# Patient Record
Sex: MALE | Race: WHITE | Employment: OTHER | URBAN - METROPOLITAN AREA
[De-identification: names, ages, dates, MRNs, and addresses within clinical notes are randomized per-mention and may not be internally consistent; named-entity substitution may affect disease eponyms.]

---

## 2021-06-09 ENCOUNTER — ANESTHESIA EVENT (OUTPATIENT)
Dept: OPERATING ROOM | Age: 81
End: 2021-06-09
Payer: MEDICARE

## 2021-06-09 RX ORDER — LANOLIN ALCOHOL/MO/W.PET/CERES
1000 CREAM (GRAM) TOPICAL
COMMUNITY

## 2021-06-09 RX ORDER — CHLORAL HYDRATE 500 MG
1000 CAPSULE ORAL DAILY
COMMUNITY

## 2021-06-09 RX ORDER — M-VIT,TX,IRON,MINS/CALC/FOLIC 27MG-0.4MG
1 TABLET ORAL DAILY
COMMUNITY

## 2021-06-10 ENCOUNTER — HOSPITAL ENCOUNTER (OUTPATIENT)
Age: 81
Discharge: HOME | End: 2021-06-10
Payer: MEDICARE

## 2021-06-10 PROCEDURE — U0003 INFECTIOUS AGENT DETECTION BY NUCLEIC ACID (DNA OR RNA); SEVERE ACUTE RESPIRATORY SYNDROME CORONAVIRUS 2 (SARS-COV-2) (CORONAVIRUS DISEASE [COVID-19]), AMPLIFIED PROBE TECHNIQUE, MAKING USE OF HIGH THROUGHPUT TECHNOLOGIES AS DESCRIBED BY CMS-2020-01-R: HCPCS

## 2021-06-10 PROCEDURE — U0005 INFEC AGEN DETEC AMPLI PROBE: HCPCS

## 2021-06-10 NOTE — PROGRESS NOTES
PRE OP INSTRUCTION SHEET   1. Do not eat or drink anything after 12 midnight  prior to surgery. This includes no water, chewing gum or mints. 2. Take the following pills will a small sip of water (see MAR)                                        3. Aspirin, Ibuprofen, Advil, Naproxen, Vitamin E, fish oil and other Anti-inflammatory products should be stopped for 5 days before surgery or as directed by your physician. 4. Check with your Doctor regarding stopping Plavix, Coumadin, Lovenox, Fragmin or other blood thinners   5. Do not smoke, and do not drink any alcoholic beverages 24 hours prior to surgery. This includes NA Beer. 6. You may brush your teeth and gargle the morning of surgery. DO NOT SWALLOW WATER   7. You MUST make arrangements for a responsible adult to take you home after your surgery. You will not be allowed to leave alone or drive yourself home. It is strongly suggested someone stay with you the first 24 hrs. Your surgery will be cancelled if you do not have a ride home. 8. A parent/legal guardian must accompany a child scheduled for surgery and plan to stay at the hospital until the child is discharged. Please do not bring other children with you. 9. Please wear simple, loose fitting clothing to the hospital.  Brian Pineda not bring valuables (money, credit cards, checkbooks, etc.) Do not wear any makeup (including no eye makeup) or nail polish on your fingers or toes. 10. DO NOT wear any jewelry or piercings on day of surgery. All body piercing jewelry must be removed. 11. If you have dentures,glasses, or contacts they will be removed before going to the OR; we will provide you a container. 12. Please see your family doctor/and cardiologist for a history & physical and/or concerning medications. Bring any test results/reports from your physician's office. Have history and labs faxed to [phone].  Remember to bring Blood Bank bracelet on the day of surgery. 14. If you have a Living Will and Durable Power of  for Healthcare, please bring in a copy. 13. Notify your Surgeon if you develop any illness between now and surgery  time, cough, cold, fever, sore throat, nausea, vomiting, etc.  Please notify your surgeon if you experience dizziness, shortness of breath or blurred vision between now & the time of your surgery   16. DO NOT shave your operative site 96 hours prior to surgery. For face & neck surgery, men may use an electric razor 48 hours prior to surgery. 17. Shower with _x__Antibacterial soap (x_chlorhexidine for total joint  Pt's) shower two times before surgery.(the morning of and the night before. 18. To provide excellent care visitors will be limited to one in the room at any given time.   Please call pre admission testing if you any further questions [phone] or [phone]

## 2021-06-11 LAB — SARS-COV-2: NOT DETECTED

## 2021-06-16 ENCOUNTER — ANESTHESIA (OUTPATIENT)
Dept: OPERATING ROOM | Age: 81
End: 2021-06-16
Payer: MEDICARE

## 2021-06-16 ENCOUNTER — HOSPITAL ENCOUNTER (OUTPATIENT)
Age: 81
Setting detail: OUTPATIENT SURGERY
Discharge: HOME | End: 2021-06-16
Attending: UROLOGY | Admitting: UROLOGY
Payer: MEDICARE

## 2021-06-16 VITALS
BODY MASS INDEX: 18.05 KG/M2 | WEIGHT: 115 LBS | HEART RATE: 55 BPM | OXYGEN SATURATION: 98 % | HEIGHT: 67 IN | RESPIRATION RATE: 16 BRPM | DIASTOLIC BLOOD PRESSURE: 62 MMHG | SYSTOLIC BLOOD PRESSURE: 165 MMHG | TEMPERATURE: 97.8 F

## 2021-06-16 VITALS
OXYGEN SATURATION: 100 % | SYSTOLIC BLOOD PRESSURE: 142 MMHG | DIASTOLIC BLOOD PRESSURE: 66 MMHG | RESPIRATION RATE: 14 BRPM

## 2021-06-16 LAB
ANION GAP SERPL CALCULATED.3IONS-SCNC: 9 MMOL/L (ref 3–16)
BUN BLDV-MCNC: 9 MG/DL (ref 7–20)
CALCIUM SERPL-MCNC: 9.1 MG/DL (ref 8.3–10.6)
CHLORIDE BLD-SCNC: 101 MMOL/L (ref 99–110)
CO2: 27 MMOL/L (ref 21–32)
CREAT SERPL-MCNC: 0.6 MG/DL (ref 0.8–1.3)
GFR AFRICAN AMERICAN: >60
GFR NON-AFRICAN AMERICAN: >60
GLUCOSE BLD-MCNC: 85 MG/DL (ref 70–99)
HCT VFR BLD CALC: 42.9 % (ref 40.5–52.5)
HEMOGLOBIN: 14.6 G/DL (ref 13.5–17.5)
MCH RBC QN AUTO: 32.1 PG (ref 26–34)
MCHC RBC AUTO-ENTMCNC: 34 G/DL (ref 31–36)
MCV RBC AUTO: 94.4 FL (ref 80–100)
PDW BLD-RTO: 12.9 % (ref 12.4–15.4)
PLATELET # BLD: 163 K/UL (ref 135–450)
PMV BLD AUTO: 9.2 FL (ref 5–10.5)
POTASSIUM REFLEX MAGNESIUM: 4.1 MMOL/L (ref 3.5–5.1)
RBC # BLD: 4.54 M/UL (ref 4.2–5.9)
SODIUM BLD-SCNC: 137 MMOL/L (ref 136–145)
WBC # BLD: 4.8 K/UL (ref 4–11)

## 2021-06-16 PROCEDURE — 2500000003 HC RX 250 WO HCPCS: Performed by: ANESTHESIOLOGY

## 2021-06-16 PROCEDURE — 2709999900 HC NON-CHARGEABLE SUPPLY: Performed by: UROLOGY

## 2021-06-16 PROCEDURE — 2500000003 HC RX 250 WO HCPCS: Performed by: NURSE ANESTHETIST, CERTIFIED REGISTERED

## 2021-06-16 PROCEDURE — 7100000010 HC PHASE II RECOVERY - FIRST 15 MIN: Performed by: UROLOGY

## 2021-06-16 PROCEDURE — 7100000011 HC PHASE II RECOVERY - ADDTL 15 MIN: Performed by: UROLOGY

## 2021-06-16 PROCEDURE — 80048 BASIC METABOLIC PNL TOTAL CA: CPT

## 2021-06-16 PROCEDURE — 3600000004 HC SURGERY LEVEL 4 BASE: Performed by: UROLOGY

## 2021-06-16 PROCEDURE — 3700000000 HC ANESTHESIA ATTENDED CARE: Performed by: UROLOGY

## 2021-06-16 PROCEDURE — 85027 COMPLETE CBC AUTOMATED: CPT

## 2021-06-16 PROCEDURE — 3600000014 HC SURGERY LEVEL 4 ADDTL 15MIN: Performed by: UROLOGY

## 2021-06-16 PROCEDURE — 36415 COLL VENOUS BLD VENIPUNCTURE: CPT

## 2021-06-16 PROCEDURE — 6360000002 HC RX W HCPCS: Performed by: UROLOGY

## 2021-06-16 PROCEDURE — 2580000003 HC RX 258: Performed by: ANESTHESIOLOGY

## 2021-06-16 PROCEDURE — 6370000000 HC RX 637 (ALT 250 FOR IP): Performed by: UROLOGY

## 2021-06-16 PROCEDURE — 2580000003 HC RX 258: Performed by: NURSE ANESTHETIST, CERTIFIED REGISTERED

## 2021-06-16 PROCEDURE — 6360000002 HC RX W HCPCS: Performed by: NURSE ANESTHETIST, CERTIFIED REGISTERED

## 2021-06-16 PROCEDURE — 3700000001 HC ADD 15 MINUTES (ANESTHESIA): Performed by: UROLOGY

## 2021-06-16 PROCEDURE — 2580000003 HC RX 258: Performed by: UROLOGY

## 2021-06-16 RX ORDER — MAGNESIUM HYDROXIDE 1200 MG/15ML
LIQUID ORAL PRN
Status: DISCONTINUED | OUTPATIENT
Start: 2021-06-16 | End: 2021-06-16 | Stop reason: ALTCHOICE

## 2021-06-16 RX ORDER — LIDOCAINE HYDROCHLORIDE 20 MG/ML
INJECTION, SOLUTION INFILTRATION; PERINEURAL PRN
Status: DISCONTINUED | OUTPATIENT
Start: 2021-06-16 | End: 2021-06-16 | Stop reason: SDUPTHER

## 2021-06-16 RX ORDER — SODIUM CHLORIDE 9 MG/ML
25 INJECTION, SOLUTION INTRAVENOUS PRN
Status: DISCONTINUED | OUTPATIENT
Start: 2021-06-16 | End: 2021-06-16 | Stop reason: HOSPADM

## 2021-06-16 RX ORDER — PHENAZOPYRIDINE HYDROCHLORIDE 200 MG/1
200 TABLET, FILM COATED ORAL 3 TIMES DAILY PRN
Qty: 15 TABLET | Refills: 0 | Status: SHIPPED | OUTPATIENT
Start: 2021-06-16 | End: 2021-06-21

## 2021-06-16 RX ORDER — SODIUM CHLORIDE, SODIUM LACTATE, POTASSIUM CHLORIDE, CALCIUM CHLORIDE 600; 310; 30; 20 MG/100ML; MG/100ML; MG/100ML; MG/100ML
INJECTION, SOLUTION INTRAVENOUS CONTINUOUS
Status: DISCONTINUED | OUTPATIENT
Start: 2021-06-16 | End: 2021-06-16 | Stop reason: HOSPADM

## 2021-06-16 RX ORDER — SODIUM CHLORIDE, SODIUM LACTATE, POTASSIUM CHLORIDE, CALCIUM CHLORIDE 600; 310; 30; 20 MG/100ML; MG/100ML; MG/100ML; MG/100ML
INJECTION, SOLUTION INTRAVENOUS CONTINUOUS PRN
Status: DISCONTINUED | OUTPATIENT
Start: 2021-06-16 | End: 2021-06-16 | Stop reason: SDUPTHER

## 2021-06-16 RX ORDER — SULFAMETHOXAZOLE AND TRIMETHOPRIM 400; 80 MG/1; MG/1
1 TABLET ORAL 2 TIMES DAILY
Qty: 8 TABLET | Refills: 0 | Status: SHIPPED | OUTPATIENT
Start: 2021-06-16 | End: 2021-06-20

## 2021-06-16 RX ORDER — MEPERIDINE HYDROCHLORIDE 25 MG/ML
12.5 INJECTION INTRAMUSCULAR; INTRAVENOUS; SUBCUTANEOUS EVERY 5 MIN PRN
Status: DISCONTINUED | OUTPATIENT
Start: 2021-06-16 | End: 2021-06-16 | Stop reason: HOSPADM

## 2021-06-16 RX ORDER — ONDANSETRON 2 MG/ML
4 INJECTION INTRAMUSCULAR; INTRAVENOUS
Status: DISCONTINUED | OUTPATIENT
Start: 2021-06-16 | End: 2021-06-16 | Stop reason: HOSPADM

## 2021-06-16 RX ORDER — PROPOFOL 10 MG/ML
INJECTION, EMULSION INTRAVENOUS PRN
Status: DISCONTINUED | OUTPATIENT
Start: 2021-06-16 | End: 2021-06-16 | Stop reason: SDUPTHER

## 2021-06-16 RX ORDER — MORPHINE SULFATE 10 MG/ML
2 INJECTION, SOLUTION INTRAMUSCULAR; INTRAVENOUS EVERY 5 MIN PRN
Status: DISCONTINUED | OUTPATIENT
Start: 2021-06-16 | End: 2021-06-16 | Stop reason: HOSPADM

## 2021-06-16 RX ORDER — SODIUM CHLORIDE 0.9 % (FLUSH) 0.9 %
10 SYRINGE (ML) INJECTION PRN
Status: DISCONTINUED | OUTPATIENT
Start: 2021-06-16 | End: 2021-06-16 | Stop reason: HOSPADM

## 2021-06-16 RX ORDER — OXYCODONE HYDROCHLORIDE AND ACETAMINOPHEN 5; 325 MG/1; MG/1
2 TABLET ORAL PRN
Status: DISCONTINUED | OUTPATIENT
Start: 2021-06-16 | End: 2021-06-16 | Stop reason: HOSPADM

## 2021-06-16 RX ORDER — SODIUM CHLORIDE 0.9 % (FLUSH) 0.9 %
10 SYRINGE (ML) INJECTION EVERY 12 HOURS SCHEDULED
Status: DISCONTINUED | OUTPATIENT
Start: 2021-06-16 | End: 2021-06-16 | Stop reason: HOSPADM

## 2021-06-16 RX ORDER — MORPHINE SULFATE 10 MG/ML
1 INJECTION, SOLUTION INTRAMUSCULAR; INTRAVENOUS EVERY 5 MIN PRN
Status: DISCONTINUED | OUTPATIENT
Start: 2021-06-16 | End: 2021-06-16 | Stop reason: HOSPADM

## 2021-06-16 RX ORDER — OXYCODONE HYDROCHLORIDE AND ACETAMINOPHEN 5; 325 MG/1; MG/1
1 TABLET ORAL PRN
Status: DISCONTINUED | OUTPATIENT
Start: 2021-06-16 | End: 2021-06-16 | Stop reason: HOSPADM

## 2021-06-16 RX ORDER — LIDOCAINE HYDROCHLORIDE 20 MG/ML
JELLY TOPICAL PRN
Status: DISCONTINUED | OUTPATIENT
Start: 2021-06-16 | End: 2021-06-16 | Stop reason: ALTCHOICE

## 2021-06-16 RX ADMIN — SODIUM CHLORIDE, POTASSIUM CHLORIDE, SODIUM LACTATE AND CALCIUM CHLORIDE: 600; 310; 30; 20 INJECTION, SOLUTION INTRAVENOUS at 13:58

## 2021-06-16 RX ADMIN — PROPOFOL 120 MG: 10 INJECTION, EMULSION INTRAVENOUS at 14:03

## 2021-06-16 RX ADMIN — FAMOTIDINE 20 MG: 10 INJECTION, SOLUTION INTRAVENOUS at 13:41

## 2021-06-16 RX ADMIN — LIDOCAINE HYDROCHLORIDE 50 MG: 20 INJECTION, SOLUTION INFILTRATION; PERINEURAL at 14:03

## 2021-06-16 RX ADMIN — SODIUM CHLORIDE, POTASSIUM CHLORIDE, SODIUM LACTATE AND CALCIUM CHLORIDE: 600; 310; 30; 20 INJECTION, SOLUTION INTRAVENOUS at 13:41

## 2021-06-16 RX ADMIN — Medication 2000 MG: at 13:56

## 2021-06-16 ASSESSMENT — ENCOUNTER SYMPTOMS: SHORTNESS OF BREATH: 0

## 2021-06-16 ASSESSMENT — LIFESTYLE VARIABLES: SMOKING_STATUS: 0

## 2021-06-16 ASSESSMENT — PULMONARY FUNCTION TESTS: PIF_VALUE: 0

## 2021-06-16 ASSESSMENT — PAIN - FUNCTIONAL ASSESSMENT: PAIN_FUNCTIONAL_ASSESSMENT: 0-10

## 2021-06-16 NOTE — ANESTHESIA POSTPROCEDURE EVALUATION
Department of Anesthesiology  Postprocedure Note    Patient: Rey Reilly  MRN: [id number]  YOB: 1940  Date of evaluation: 6/16/2021  Time:  2:26 PM     Procedure Summary     Date: 06/16/21 Room / Location: [place] [address] / [facility]    Anesthesia Start: 1518 Anesthesia Stop: 6492    Procedure: CYSTOSCOPY, URETHRAL DILATATION (N/A Bladder) Diagnosis: (BENIGN PROSTATIC HYPERTROPHY)    Surgeons: Cedric Marr MD Responsible Provider: Jonnathan Dickerson MD    Anesthesia Type: general, TIVA ASA Status: 2          Anesthesia Type: general, TIVA    Carmelo Phase I: Carmelo Score: 10    Carmelo Phase II:      Last vitals: Reviewed and per EMR flowsheets.      Vitals:    06/10/21 0951 06/16/21 1329   BP:  (!) 180/70   Pulse:  63   Resp:  18   Temp:  97.8 °F (36.6 °C)   TempSrc:  Temporal   SpO2:  99%   Weight: 120 lb (54.4 kg) 115 lb (52.2 kg)   Height: 5' 7\" (1.702 m) 5' 7\" (1.702 m)     Anesthesia Post Evaluation    Patient location during evaluation: bedside  Patient participation: complete - patient participated  Level of consciousness: awake and alert  Airway patency: patent  Nausea & Vomiting: no nausea  Complications: no  Cardiovascular status: hemodynamically stable  Respiratory status: acceptable  Hydration status: euvolemic

## 2021-06-16 NOTE — ANESTHESIA PRE PROCEDURE
Department of Anesthesiology  Preprocedure Note       Name:  Carol Jimenez   Age:  80 y.o.  :  1940                                          MRN:  [id number]         Date:  2021      Surgeon: Юлия Nicolas):  Skeet Najjar, MD    Procedure: Procedure(s):  CYSTOSCOPY, POSSIBLE URETHRAL DILATATION, POSSIBLE BLADDER BIOPSY    Medications prior to admission:   Prior to Admission medications    Medication Sig Start Date End Date Taking? Authorizing Provider   Multiple Vitamins-Minerals (THERAPEUTIC MULTIVITAMIN-MINERALS) tablet Take 1 tablet by mouth daily   Yes Historical Provider, MD   Omega-3 Fatty Acids (FISH OIL) 1000 MG CAPS Take 1,000 mg by mouth daily   Yes Historical Provider, MD   vitamin B-12 (CYANOCOBALAMIN) 1000 MCG tablet Take 1,000 mcg by mouth Twice a Week   Yes Historical Provider, MD       Current medications:    Current Facility-Administered Medications   Medication Dose Route Frequency Provider Last Rate Last Admin    lactated ringers infusion   Intravenous Continuous Terrence Balbuena  mL/hr at 21 1341 New Bag at 21 1341    sodium chloride flush 0.9 % injection 10 mL  10 mL Intravenous 2 times per day Terrence Balbuena MD        sodium chloride flush 0.9 % injection 10 mL  10 mL Intravenous PRN Terrence Balbuena MD        0.9 % sodium chloride infusion  25 mL Intravenous PRN Terrence Balbuena MD        ceFAZolin (ANCEF) 2000 mg in sterile water 20 mL IV syringe  2,000 mg Intravenous Once Skeet Najjar, MD           Allergies:  No Known Allergies    Problem List:  There is no problem list on file for this patient.       Past Medical History:        Diagnosis Date    Fatty liver        Past Surgical History:        Procedure Laterality Date    COLONOSCOPY  2002    COLONOSCOPY  2011       Social History:    Social History     Tobacco Use    Smoking status: Former Smoker     Quit date: 1982     Years since quittin.5    Smokeless tobacco: Never Used   Substance Use Topics    Alcohol use: Yes     Comment: quit 6 months ago                                Counseling given: Not Answered      Vital Signs (Current):   Vitals:    06/10/21 0951 21 1329   BP:  (!) 180/70   Pulse:  63   Resp:  18   Temp:  97.8 °F (36.6 °C)   TempSrc:  Temporal   SpO2:  99%   Weight: 120 lb (54.4 kg) 115 lb (52.2 kg)   Height: 5' 7\" (1.702 m) 5' 7\" (1.702 m)                                              BP Readings from Last 3 Encounters:   21 (!) 180/70   11 144/84       NPO Status: Time of last liquid consumption:                         Time of last solid consumption:                         Date of last liquid consumption: 06/15/21                        Date of last solid food consumption: 06/15/21    BMI:   Wt Readings from Last 3 Encounters:   21 115 lb (52.2 kg)   11 108 lb (49 kg)     Body mass index is 18.01 kg/m². CBC:   Lab Results   Component Value Date    WBC 4.8 2021    RBC 4.54 2021    HGB 14.6 2021    HCT 42.9 2021    MCV 94.4 2021    RDW 12.9 2021     2021       CMP: No results found for: NA, K, CL, CO2, BUN, CREATININE, GFRAA, AGRATIO, LABGLOM, GLUCOSE, PROT, CALCIUM, BILITOT, ALKPHOS, AST, ALT    POC Tests: No results for input(s): POCGLU, POCNA, POCK, POCCL, POCBUN, POCHEMO, POCHCT in the last 72 hours.     Coags: No results found for: PROTIME, INR, APTT    HCG (If Applicable): No results found for: PREGTESTUR, PREGSERUM, HCG, HCGQUANT     ABGs: No results found for: PHART, PO2ART, ZOY0EOX, BRR6SYU, BEART, H1VNSRJH     Type & Screen (If Applicable):  No results found for: LABABO, LABRH    Drug/Infectious Status (If Applicable):  No results found for: HIV, HEPCAB    COVID-19 Screening (If Applicable):   Lab Results   Component Value Date    COVID19 Not Detected 06/10/2021           Anesthesia Evaluation  Patient summary reviewed no history of anesthetic complications:   Airway: Mallampati: II  TM distance: >3 FB   Neck ROM: full  Mouth opening: > = 3 FB Dental:          Pulmonary: breath sounds clear to auscultation  (+) COPD (no o2 req., no inhalers or nebs, no physical limiys):      (-) asthma, shortness of breath, recent URI, sleep apnea and not a current smoker          Patient did not smoke on day of surgery. Cardiovascular:Negative CV ROS        (-) pacemaker, hypertension, past MI, CAD, CABG/stent, dysrhythmias,  angina and  CHF    ECG reviewed  Rhythm: regular  Rate: normal           Beta Blocker:  Not on Beta Blocker         Neuro/Psych:   Negative Neuro/Psych ROS     (-) seizures, TIA, CVA and psychiatric history           GI/Hepatic/Renal:   (+) liver disease (fatty):,      (-) GERD, no renal disease, bowel prep and no morbid obesity       Endo/Other:    (+) : arthritis:., no malignancy/cancer. (-) diabetes mellitus, hypothyroidism, hyperthyroidism, blood dyscrasia, no malignancy/cancer               Abdominal:           Vascular: negative vascular ROS. Anesthesia Plan      general and TIVA     ASA 2       Induction: intravenous. MIPS: Postoperative opioids intended and Prophylactic antiemetics administered. Anesthetic plan and risks discussed with patient. Plan discussed with CRNA. This pre-anesthesia assessment may be used as a history and physical.    DOS STAFF ADDENDUM:    Pt seen and examined, chart reviewed (including anesthesia, drug and allergy history). No interval changes to history and physical examination. Anesthetic plan, risks, benefits, alternatives, and personnel involved discussed with patient. Patient verbalized an understanding and agrees to proceed.       Mily Nicole MD  June 16, 2021  1:57 PM      Mily Nicole MD   6/16/2021

## 2021-06-18 NOTE — OP NOTE
[address]                 [address] [facility], [place] [address]                                OPERATIVE REPORT    PATIENT NAME: Kristan Comer                       :        1940  MED REC NO:   [id number]                          ROOM:  ACCOUNT NO:   [de-identified]                           ADMIT DATE: 2021  PROVIDER:     Shandra Hawkins MD    DATE OF PROCEDURE:  2021    PREOPERATIVE DIAGNOSIS:  1.  Voiding difficulty. 2.  BPH. 3.  Incomplete bladder emptying. POSTOPERATIVE DIAGNOSES:  1.  Voiding difficulty. 2.  BPH. 3.  Incomplete bladder emptying. OPERATION PERFORMED:  Cystoscopy with urethral dilation. PRIMARY SURGEON:  Shandra Hawkins MD    ANESTHESIA:  General.    Fauzia Leaks BLOOD LOSS:  5 mL. OPERATIVE FINDINGS:  1.  Bilobar hyperplasia along with a large median lobe extending into  the patient's bladder. 2.  No evidence for bladder cancer, tumors, or stones. 3.  Diffuse trabeculation of the bladder consistent with chronic outlet  obstruction. HISTORY AND INDICATIONS:  A 81-year-old white male with a history of  worsening obstructive and irritative voiding symptoms. To rule out  bladder pathology and assess for outlet obstruction, he was scheduled  for cystourethroscopy. Risks, benefits, and expected outcomes of the  procedure have been discussed. PROCEDURE IN DETAIL:  After obtaining informed consent, the patient was  taken to the operative suite. He was given a general anesthetic and  placed on table in modified dorsal lithotomy position. Prepping and  draping was done in sterile fashion. Cystourethroscopy was then  performed with both 30- and 70-degree lenses. Trilobar hyperplasia was  noted with large median lobe and bilobar hyperplasia. Bladder itself  was found to be diffusely trabeculated.   There is no evidence for any  bladder cancer, tumors, or stones and both ureteral orifices were found  to be orthotopic with clear efflux. Dilation was then carried out with  Fayetta Carlos Eduardo sounds to 28-[place]. His bladder was drained. Lidocaine  jelly was applied. He was then taken back to postop recovery room in  stable condition. Postoperative consultation done with the patient and his family and  pictures have been discussed showing the obstructing prostate tissue. He will be seen in the office in the next 2-3 weeks and will be  scheduled likely for an outpatient TURP to remove the obstructing  prostatic fossa and to help preserve his remaining bladder function.         Rell Thompson MD    D: 06/17/2021 22:31:10       T: 06/17/2021 22:38:59     [email or web address]  Job#: [id number]     Doc#: [id number]    CC:

## 2022-03-07 ENCOUNTER — APPOINTMENT (OUTPATIENT)
Dept: CT IMAGING | Age: 82
DRG: 178 | End: 2022-03-07
Payer: MEDICARE

## 2022-03-07 ENCOUNTER — HOSPITAL ENCOUNTER (INPATIENT)
Age: 82
LOS: 1 days | Discharge: HOME HEALTH | DRG: 178 | End: 2022-03-08
Attending: EMERGENCY MEDICINE | Admitting: INTERNAL MEDICINE
Payer: MEDICARE

## 2022-03-07 ENCOUNTER — APPOINTMENT (OUTPATIENT)
Dept: GENERAL RADIOLOGY | Age: 82
DRG: 178 | End: 2022-03-07
Payer: MEDICARE

## 2022-03-07 DIAGNOSIS — Z86.16 HISTORY OF COVID-19: ICD-10-CM

## 2022-03-07 DIAGNOSIS — J96.01 ACUTE RESPIRATORY FAILURE WITH HYPOXIA (HCC): ICD-10-CM

## 2022-03-07 DIAGNOSIS — S22.31XA CLOSED FRACTURE OF ONE RIB OF RIGHT SIDE, INITIAL ENCOUNTER: ICD-10-CM
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
## 2022-03-07 DIAGNOSIS — U07.1 COVID-19: ICD-10-CM

## 2022-03-07 LAB
A/G RATIO: 1 (ref 1.1–2.2)
ALBUMIN SERPL-MCNC: 3.5 G/DL (ref 3.4–5)
ALP BLD-CCNC: 70 U/L (ref 40–129)
ALT SERPL-CCNC: 18 U/L (ref 10–40)
ANION GAP SERPL CALCULATED.3IONS-SCNC: 8 MMOL/L (ref 3–16)
AST SERPL-CCNC: 35 U/L (ref 15–37)
BASOPHILS ABSOLUTE: 0 K/UL (ref 0–0.2)
BASOPHILS RELATIVE PERCENT: 0.4 %
BILIRUB SERPL-MCNC: <0.2 MG/DL (ref 0–1)
BILIRUBIN URINE: NEGATIVE
BLOOD, URINE: NEGATIVE
BUN BLDV-MCNC: 14 MG/DL (ref 7–20)
C-REACTIVE PROTEIN: 72.5 MG/L (ref 0–5.1)
CALCIUM SERPL-MCNC: 8.9 MG/DL (ref 8.3–10.6)
CHLORIDE BLD-SCNC: 94 MMOL/L (ref 99–110)
CLARITY: CLEAR
CO2: 30 MMOL/L (ref 21–32)
COLOR: YELLOW
CREAT SERPL-MCNC: 0.8 MG/DL (ref 0.8–1.3)
EKG ATRIAL RATE: 83 BPM
EKG DIAGNOSIS: NORMAL
EKG P AXIS: 79 DEGREES
EKG P-R INTERVAL: 142 MS
EKG Q-T INTERVAL: 364 MS
EKG QRS DURATION: 70 MS
EKG QTC CALCULATION (BAZETT): 427 MS
EKG R AXIS: 15 DEGREES
EKG T AXIS: 77 DEGREES
EKG VENTRICULAR RATE: 83 BPM
EOSINOPHILS ABSOLUTE: 0 K/UL (ref 0–0.6)
EOSINOPHILS RELATIVE PERCENT: 0.2 %
EPITHELIAL CELLS, UA: ABNORMAL /HPF (ref 0–5)
GFR AFRICAN AMERICAN: >60
GFR NON-AFRICAN AMERICAN: >60
GLUCOSE BLD-MCNC: 92 MG/DL (ref 70–99)
GLUCOSE URINE: NEGATIVE MG/DL
HCT VFR BLD CALC: 38.3 % (ref 40.5–52.5)
HEMOGLOBIN: 13.3 G/DL (ref 13.5–17.5)
INFLUENZA A: NOT DETECTED
INFLUENZA B: NOT DETECTED
KETONES, URINE: NEGATIVE MG/DL
LACTATE DEHYDROGENASE: 338 U/L (ref 100–190)
LACTIC ACID, SEPSIS: 1.2 MMOL/L (ref 0.4–1.9)
LEUKOCYTE ESTERASE, URINE: NEGATIVE
LYMPHOCYTES ABSOLUTE: 0.3 K/UL (ref 1–5.1)
LYMPHOCYTES RELATIVE PERCENT: 8.8 %
MCH RBC QN AUTO: 31 PG (ref 26–34)
MCHC RBC AUTO-ENTMCNC: 34.6 G/DL (ref 31–36)
MCV RBC AUTO: 89.7 FL (ref 80–100)
MICROSCOPIC EXAMINATION: YES
MONOCYTES ABSOLUTE: 0.5 K/UL (ref 0–1.3)
MONOCYTES RELATIVE PERCENT: 13.5 %
MUCUS: ABNORMAL /LPF
NEUTROPHILS ABSOLUTE: 3 K/UL (ref 1.7–7.7)
NEUTROPHILS RELATIVE PERCENT: 77.1 %
NITRITE, URINE: NEGATIVE
PDW BLD-RTO: 12.7 % (ref 12.4–15.4)
PH UA: 7 (ref 5–8)
PLATELET # BLD: 151 K/UL (ref 135–450)
PMV BLD AUTO: 8.4 FL (ref 5–10.5)
POTASSIUM REFLEX MAGNESIUM: 4 MMOL/L (ref 3.5–5.1)
PRO-BNP: 171 PG/ML (ref 0–449)
PROCALCITONIN: 0.1 NG/ML (ref 0–0.15)
PROTEIN UA: ABNORMAL MG/DL
RBC # BLD: 4.27 M/UL (ref 4.2–5.9)
RBC UA: ABNORMAL /HPF (ref 0–4)
SARS-COV-2 RNA, RT PCR: DETECTED
SODIUM BLD-SCNC: 132 MMOL/L (ref 136–145)
SPECIFIC GRAVITY UA: 1.01 (ref 1–1.03)
TOTAL PROTEIN: 6.9 G/DL (ref 6.4–8.2)
TROPONIN: <0.01 NG/ML
URINE REFLEX TO CULTURE: ABNORMAL
URINE TYPE: ABNORMAL
UROBILINOGEN, URINE: 0.2 E.U./DL
WBC # BLD: 3.8 K/UL (ref 4–11)
WBC UA: ABNORMAL /HPF (ref 0–5)

## 2022-03-07 PROCEDURE — 6360000004 HC RX CONTRAST MEDICATION: Performed by: NURSE PRACTITIONER

## 2022-03-07 PROCEDURE — 96374 THER/PROPH/DIAG INJ IV PUSH: CPT

## 2022-03-07 PROCEDURE — 97535 SELF CARE MNGMENT TRAINING: CPT

## 2022-03-07 PROCEDURE — 82728 ASSAY OF FERRITIN: CPT

## 2022-03-07 PROCEDURE — 97161 PT EVAL LOW COMPLEX 20 MIN: CPT

## 2022-03-07 PROCEDURE — 99285 EMERGENCY DEPT VISIT HI MDM: CPT

## 2022-03-07 PROCEDURE — 6370000000 HC RX 637 (ALT 250 FOR IP): Performed by: NURSE PRACTITIONER

## 2022-03-07 PROCEDURE — 87636 SARSCOV2 & INF A&B AMP PRB: CPT

## 2022-03-07 PROCEDURE — 97166 OT EVAL MOD COMPLEX 45 MIN: CPT

## 2022-03-07 PROCEDURE — 6360000002 HC RX W HCPCS: Performed by: NURSE PRACTITIONER

## 2022-03-07 PROCEDURE — 71260 CT THORAX DX C+: CPT

## 2022-03-07 PROCEDURE — 84484 ASSAY OF TROPONIN QUANT: CPT

## 2022-03-07 PROCEDURE — 84145 PROCALCITONIN (PCT): CPT

## 2022-03-07 PROCEDURE — 93005 ELECTROCARDIOGRAM TRACING: CPT | Performed by: NURSE PRACTITIONER

## 2022-03-07 PROCEDURE — 81001 URINALYSIS AUTO W/SCOPE: CPT

## 2022-03-07 PROCEDURE — 2580000003 HC RX 258: Performed by: HOSPITALIST

## 2022-03-07 PROCEDURE — 80053 COMPREHEN METABOLIC PANEL: CPT

## 2022-03-07 PROCEDURE — 85025 COMPLETE CBC W/AUTO DIFF WBC: CPT

## 2022-03-07 PROCEDURE — 83605 ASSAY OF LACTIC ACID: CPT

## 2022-03-07 PROCEDURE — 1200000000 HC SEMI PRIVATE

## 2022-03-07 PROCEDURE — 71101 X-RAY EXAM UNILAT RIBS/CHEST: CPT

## 2022-03-07 PROCEDURE — 96375 TX/PRO/DX INJ NEW DRUG ADDON: CPT

## 2022-03-07 PROCEDURE — 6370000000 HC RX 637 (ALT 250 FOR IP): Performed by: HOSPITALIST

## 2022-03-07 PROCEDURE — 93010 ELECTROCARDIOGRAM REPORT: CPT | Performed by: INTERNAL MEDICINE

## 2022-03-07 PROCEDURE — 86140 C-REACTIVE PROTEIN: CPT

## 2022-03-07 PROCEDURE — 83880 ASSAY OF NATRIURETIC PEPTIDE: CPT

## 2022-03-07 PROCEDURE — 83615 LACTATE (LD) (LDH) ENZYME: CPT

## 2022-03-07 PROCEDURE — 87040 BLOOD CULTURE FOR BACTERIA: CPT

## 2022-03-07 PROCEDURE — 97116 GAIT TRAINING THERAPY: CPT

## 2022-03-07 PROCEDURE — 36415 COLL VENOUS BLD VENIPUNCTURE: CPT

## 2022-03-07 RX ORDER — M-VIT,TX,IRON,MINS/CALC/FOLIC 27MG-0.4MG
1 TABLET ORAL DAILY
Status: DISCONTINUED | OUTPATIENT
Start: 2022-03-07 | End: 2022-03-08 | Stop reason: HOSPADM

## 2022-03-07 RX ORDER — ACETAMINOPHEN 325 MG/1
650 TABLET ORAL EVERY 6 HOURS PRN
Status: DISCONTINUED | OUTPATIENT
Start: 2022-03-07 | End: 2022-03-08 | Stop reason: HOSPADM

## 2022-03-07 RX ORDER — MORPHINE SULFATE 2 MG/ML
2 INJECTION, SOLUTION INTRAMUSCULAR; INTRAVENOUS EVERY 4 HOURS PRN
Status: DISCONTINUED | OUTPATIENT
Start: 2022-03-07 | End: 2022-03-08 | Stop reason: HOSPADM

## 2022-03-07 RX ORDER — SODIUM CHLORIDE 0.9 % (FLUSH) 0.9 %
5-40 SYRINGE (ML) INJECTION EVERY 12 HOURS SCHEDULED
Status: DISCONTINUED | OUTPATIENT
Start: 2022-03-07 | End: 2022-03-08 | Stop reason: HOSPADM

## 2022-03-07 RX ORDER — POLYETHYLENE GLYCOL 3350 17 G/17G
17 POWDER, FOR SOLUTION ORAL DAILY PRN
Status: DISCONTINUED | OUTPATIENT
Start: 2022-03-07 | End: 2022-03-08 | Stop reason: HOSPADM

## 2022-03-07 RX ORDER — LIDOCAINE 4 G/G
1 PATCH TOPICAL ONCE
Status: COMPLETED | OUTPATIENT
Start: 2022-03-07 | End: 2022-03-08

## 2022-03-07 RX ORDER — ONDANSETRON 4 MG/1
4 TABLET, ORALLY DISINTEGRATING ORAL EVERY 8 HOURS PRN
Status: DISCONTINUED | OUTPATIENT
Start: 2022-03-07 | End: 2022-03-08 | Stop reason: HOSPADM

## 2022-03-07 RX ORDER — HYDROCODONE BITARTRATE AND ACETAMINOPHEN 7.5; 325 MG/1; MG/1
1 TABLET ORAL EVERY 6 HOURS PRN
Status: DISCONTINUED | OUTPATIENT
Start: 2022-03-07 | End: 2022-03-08 | Stop reason: HOSPADM

## 2022-03-07 RX ORDER — HYDROCODONE BITARTRATE AND ACETAMINOPHEN 5; 325 MG/1; MG/1
1 TABLET ORAL EVERY 8 HOURS PRN
Qty: 12 TABLET | Refills: 0 | Status: SHIPPED | OUTPATIENT
Start: 2022-03-07 | End: 2022-03-10

## 2022-03-07 RX ORDER — DEXAMETHASONE SODIUM PHOSPHATE 10 MG/ML
6 INJECTION, SOLUTION INTRAMUSCULAR; INTRAVENOUS EVERY 24 HOURS
Status: DISCONTINUED | OUTPATIENT
Start: 2022-03-08 | End: 2022-03-08 | Stop reason: HOSPADM

## 2022-03-07 RX ORDER — CHOLECALCIFEROL (VITAMIN D3) 125 MCG
1000 CAPSULE ORAL
Status: DISCONTINUED | OUTPATIENT
Start: 2022-03-07 | End: 2022-03-08 | Stop reason: HOSPADM

## 2022-03-07 RX ORDER — LIDOCAINE 4 G/G
1 PATCH TOPICAL DAILY
Status: DISCONTINUED | OUTPATIENT
Start: 2022-03-08 | End: 2022-03-08 | Stop reason: HOSPADM

## 2022-03-07 RX ORDER — SODIUM CHLORIDE 9 MG/ML
25 INJECTION, SOLUTION INTRAVENOUS PRN
Status: DISCONTINUED | OUTPATIENT
Start: 2022-03-07 | End: 2022-03-08 | Stop reason: HOSPADM

## 2022-03-07 RX ORDER — DEXAMETHASONE SODIUM PHOSPHATE 10 MG/ML
4 INJECTION, SOLUTION INTRAMUSCULAR; INTRAVENOUS EVERY 6 HOURS
Status: DISCONTINUED | OUTPATIENT
Start: 2022-03-07 | End: 2022-03-07

## 2022-03-07 RX ORDER — CHLORAL HYDRATE 500 MG
1000 CAPSULE ORAL DAILY
Status: DISCONTINUED | OUTPATIENT
Start: 2022-03-07 | End: 2022-03-07 | Stop reason: RX

## 2022-03-07 RX ORDER — ACETAMINOPHEN 650 MG/1
650 SUPPOSITORY RECTAL EVERY 6 HOURS PRN
Status: DISCONTINUED | OUTPATIENT
Start: 2022-03-07 | End: 2022-03-08 | Stop reason: HOSPADM

## 2022-03-07 RX ORDER — MORPHINE SULFATE 2 MG/ML
2 INJECTION, SOLUTION INTRAMUSCULAR; INTRAVENOUS ONCE
Status: COMPLETED | OUTPATIENT
Start: 2022-03-07 | End: 2022-03-07

## 2022-03-07 RX ORDER — SODIUM CHLORIDE 0.9 % (FLUSH) 0.9 %
5-40 SYRINGE (ML) INJECTION PRN
Status: DISCONTINUED | OUTPATIENT
Start: 2022-03-07 | End: 2022-03-08 | Stop reason: HOSPADM

## 2022-03-07 RX ORDER — ONDANSETRON 2 MG/ML
4 INJECTION INTRAMUSCULAR; INTRAVENOUS EVERY 6 HOURS PRN
Status: DISCONTINUED | OUTPATIENT
Start: 2022-03-07 | End: 2022-03-08 | Stop reason: HOSPADM

## 2022-03-07 RX ORDER — LIDOCAINE 50 MG/G
1 PATCH TOPICAL DAILY
Qty: 10 PATCH | Refills: 0 | Status: SHIPPED | OUTPATIENT
Start: 2022-03-07 | End: 2022-03-17

## 2022-03-07 RX ORDER — METHOCARBAMOL 500 MG/1
500 TABLET, FILM COATED ORAL 2 TIMES DAILY
Qty: 10 TABLET | Refills: 0 | Status: SHIPPED | OUTPATIENT
Start: 2022-03-07 | End: 2022-03-12

## 2022-03-07 RX ADMIN — ENOXAPARIN SODIUM 30 MG: 100 INJECTION SUBCUTANEOUS at 20:58

## 2022-03-07 RX ADMIN — CYANOCOBALAMIN TAB 500 MCG 1000 MCG: 500 TAB at 23:08

## 2022-03-07 RX ADMIN — IOPAMIDOL 85 ML: 755 INJECTION, SOLUTION INTRAVENOUS at 18:21

## 2022-03-07 RX ADMIN — Medication 10 ML: at 23:07

## 2022-03-07 RX ADMIN — MORPHINE SULFATE 2 MG: 2 INJECTION, SOLUTION INTRAMUSCULAR; INTRAVENOUS at 12:38

## 2022-03-07 RX ADMIN — Medication 1 TABLET: at 20:57

## 2022-03-07 RX ADMIN — DEXAMETHASONE SODIUM PHOSPHATE 4 MG: 10 INJECTION, SOLUTION INTRAMUSCULAR; INTRAVENOUS at 17:01

## 2022-03-07 ASSESSMENT — PAIN DESCRIPTION - ORIENTATION: ORIENTATION: RIGHT

## 2022-03-07 ASSESSMENT — PAIN DESCRIPTION - LOCATION: LOCATION: RIB CAGE

## 2022-03-07 ASSESSMENT — PAIN SCALES - GENERAL
PAINLEVEL_OUTOF10: 5
PAINLEVEL_OUTOF10: 2
PAINLEVEL_OUTOF10: 2
PAINLEVEL_OUTOF10: 0

## 2022-03-07 ASSESSMENT — PAIN - FUNCTIONAL ASSESSMENT: PAIN_FUNCTIONAL_ASSESSMENT: 0-10

## 2022-03-07 NOTE — ED NOTES
Ambulatory pulse ox. ..start at 1424 resting room air SaO2 of 93%, respirations 24, pulse of 98. Patient was ambulated for 6 minutes, with ending SaO2 of 91%, respirations of 30, and heart rate of 104.      Miguel Alexis  03/07/22 1434

## 2022-03-07 NOTE — ED PROVIDER NOTES
Tam[address] 52        Pt Name: Rao Das  MRN: [id number]  [place] 1940  Dateof evaluation: 3/7/2022  Provider: LIZET Rabago - KANA  PCP: Macrina Almanzar MD  ED Attending: No att. providers found    [address] [place] [address]       Chief Complaint   Patient presents with    Rib Injury     Slipped getting out of the bathtub on Friday. Right sided rib pain. Tested positive for Covid on Friday, denies SOB. HISTORY OF PRESENTILLNESS   (Location/Symptom, Timing/Onset, Context/Setting, Quality, Duration, Modifying Factors, Severity)  Note limiting factors. Rao Das is a 80 y.o. male for right-sided rib pain. Onset was 3 days. Context includes patient reports that he slipped on a wet tub landing on his right rib cage. Patient reports that he has had pain for the last 3 days. Patient reports that he recently tested positive for Covid and has been on antibiotics for bacterial pneumonia from his primary care doctor. Patient denies any fevers nausea vomiting or diarrhea. He does report he has had a cough however has had pain with coughing. He denies chest pain or shortness of breath. Patient denies hitting his head with this fall. He states that he slipped on a wet surface. Alleviating factors include nothing. Aggravating factors include nothing. Pain is 10/10. Nothing has been used for pain today. Nursing Notes were all reviewed and agreed with or any disagreements were addressed  in the HPI. REVIEW OF SYSTEMS    (2-9 systems for level 4, 10 or more for level 5)     Review of Systems   Constitutional: Negative for activity change, appetite change and fever. HENT: Negative for congestion, facial swelling, rhinorrhea and sore throat. Eyes: Negative for visual disturbance. Respiratory: Negative for shortness of breath. Right-sided rib   Cardiovascular: Negative for chest pain.    Gastrointestinal: Negative for abdominal pain. Genitourinary: Negative for difficulty urinating. Musculoskeletal: Negative for arthralgias and myalgias. Skin: Negative for color change and rash. Neurological: Negative for dizziness and light-headedness. Psychiatric/Behavioral: Negative for agitation. All other systems reviewed and are negative. Positives and Pertinent negatives as per HPI. Except as noted above in the ROS, all other systems were reviewed and negative.        PAST MEDICAL HISTORY     Past Medical History:   Diagnosis Date    BPH (benign prostatic hyperplasia)     Cataract     COVID 03/04/2022    Fatty liver 2011    Glaucoma     Heart murmur after rheumatic heart disease     Hyperlipidemia     Hypertension     Left rotator cuff tear     Pneumothorax     per pt    Rheumatic fever     when pt was young    Strep throat          SURGICAL HISTORY       Past Surgical History:   Procedure Laterality Date    COLONOSCOPY  03/08/2002    COLONOSCOPY  12/06/2011    CYSTOSCOPY N/A 06/16/2021    URETHRAL DILATATION    CYSTOSCOPY N/A 6/16/2021    CYSTOSCOPY, URETHRAL DILATATION performed by Aguila Sanchez MD at [address] [place] [address]       Discharge Medication List as of 3/8/2022 12:23 PM      CONTINUE these medications which have NOT CHANGED    Details   Multiple Vitamins-Minerals (THERAPEUTIC MULTIVITAMIN-MINERALS) tablet Take 1 tablet by mouth dailyHistorical Med      Omega-3 Fatty Acids (FISH OIL) 1000 MG CAPS Take 1,000 mg by mouth dailyHistorical Med      vitamin B-12 (CYANOCOBALAMIN) 1000 MCG tablet Take 1,000 mcg by mouth Twice a WeekHistorical Med               ALLERGIES     Amoxicillin    FAMILY HISTORY       Family History   Problem Relation Age of Onset    Heart Disease Father           SOCIAL HISTORY       Social History     Socioeconomic History    Marital status:      Spouse name: Lisette Drake Number of children: None    Years of education: None    Highest education level: None   Occupational History    None   Tobacco Use    Smoking status: Former Smoker     Quit date: 1982     Years since quittin.2    Smokeless tobacco: Never Used   Vaping Use    Vaping Use: Never used   Substance and Sexual Activity    Alcohol use: Not Currently     Comment: quit     Drug use: Never    Sexual activity: Not Currently     Partners: Female   Other Topics Concern    None   Social History Narrative    None     Social Determinants of Health     Financial Resource Strain:     Difficulty of Paying Living Expenses: Not on file   Food Insecurity:     Worried About Running Out of Food in the Last Year: Not on file    Rochelle of Food in the Last Year: Not on file   Transportation Needs:     Lack of Transportation (Medical): Not on file    Lack of Transportation (Non-Medical):  Not on file   Physical Activity:     Days of Exercise per Week: Not on file    Minutes of Exercise per Session: Not on file   Stress:     Feeling of Stress : Not on file   Social Connections:     Frequency of Communication with Friends and Family: Not on file    Frequency of Social Gatherings with Friends and Family: Not on file    Attends Episcopal Services: Not on file    Active Member of [address] [place] [address] [employer] or Organizations: Not on file    Attends Club or Organization Meetings: Not on file    Marital Status: Not on file   Intimate Partner Violence:     Fear of Current or Ex-Partner: Not on file    Emotionally Abused: Not on file    Physically Abused: Not on file    Sexually Abused: Not on file   Housing Stability:     Unable to Pay for Housing in the Last Year: Not on file    Number of [place] in the Last Year: Not on file    Unstable Housing in the Last Year: Not on file       SCREENINGS    Justo Coma Scale  Eye Opening: Spontaneous  Best Verbal Response: Oriented  Best Motor Response: Obeys commands  Fraser Coma Scale Score: 15        PHYSICAL EXAM  (up to 7 for level 4, 8 or more for level 5)     ED Triage Vitals [03/07/22 1116]   BP Temp Temp Source Pulse Resp SpO2 Height Weight   127/61 98.8 °F (37.1 °C) Oral 105 18 96 % 5' 7\" (1.702 m) 114 lb (51.7 kg)       Physical Exam  Constitutional:       Appearance: He is well-developed. HENT:      Head: Normocephalic and atraumatic. Cardiovascular:      Rate and Rhythm: Tachycardia present. Pulmonary:      Effort: Pulmonary effort is normal. Tachypnea present. No respiratory distress. Breath sounds: Examination of the right-lower field reveals decreased breath sounds. Examination of the left-lower field reveals decreased breath sounds. Decreased breath sounds present. Chest:      Chest wall: Tenderness present. Abdominal:      General: There is no distension. Palpations: Abdomen is soft. Tenderness: There is no abdominal tenderness. Musculoskeletal:         General: Normal range of motion. Cervical back: Normal range of motion. Skin:     General: Skin is warm and dry. Neurological:      Mental Status: He is alert and oriented to person, place, and time.          DIAGNOSTIC RESULTS   LABS:    Labs Reviewed   COVID-19 & INFLUENZA COMBO - Abnormal; Notable for the following components:       Result Value    SARS-CoV-2 RNA, RT PCR DETECTED (*)     All other components within normal limits   CBC WITH AUTO DIFFERENTIAL - Abnormal; Notable for the following components:    WBC 3.8 (*)     Hemoglobin 13.3 (*)     Hematocrit 38.3 (*)     Lymphocytes Absolute 0.3 (*)     All other components within normal limits   COMPREHENSIVE METABOLIC PANEL W/ REFLEX TO MG FOR LOW K - Abnormal; Notable for the following components:    Sodium 132 (*)     Chloride 94 (*)     Albumin/Globulin Ratio 1.0 (*)     All other components within normal limits   URINALYSIS WITH REFLEX TO CULTURE - Abnormal; Notable for the following components:    Protein, UA TRACE (*)     All other components within normal limits   MICROSCOPIC URINALYSIS - Abnormal; Notable for the following components:    Mucus, UA 1+ (*)     All other components within normal limits   C-REACTIVE PROTEIN - Abnormal; Notable for the following components:    CRP 72.5 (*)     All other components within normal limits   FERRITIN - Abnormal; Notable for the following components:    Ferritin 636.5 (*)     All other components within normal limits   LACTATE DEHYDROGENASE - Abnormal; Notable for the following components:     (*)     All other components within normal limits   CBC WITH AUTO DIFFERENTIAL - Abnormal; Notable for the following components:    WBC 2.4 (*)     RBC 4.18 (*)     Hemoglobin 12.8 (*)     Hematocrit 37.4 (*)     Lymphocytes Absolute 0.3 (*)     All other components within normal limits   COMPREHENSIVE METABOLIC PANEL W/ REFLEX TO MG FOR LOW K - Abnormal; Notable for the following components:    Sodium 133 (*)     Chloride 95 (*)     Glucose 137 (*)     CREATININE 0.7 (*)     Albumin 3.3 (*)     Albumin/Globulin Ratio 0.9 (*)     All other components within normal limits   CULTURE, BLOOD 2    Narrative:     ORDER#: [id number]                          ORDERED BY: WENDY MALONE  SOURCE: Blood lfa                          COLLECTED:  03/07/22 12:37  ANTIBIOTICS AT CASSY.:                      RECEIVED :  03/07/22 18:02  If child <=2 yrs old please draw pediatric bottle. ~Blood Culture #2   CULTURE, BLOOD 1    Narrative:     ORDER#: [id number]                          ORDERED BY: Tamy Robin  SOURCE: Blood RAC                          COLLECTED:  03/07/22 11:57  ANTIBIOTICS AT CASSY.:                      RECEIVED :  03/07/22 18:02  If child <=2 yrs old please draw pediatric bottle. ~Blood Culture 1   PROCALCITONIN   LACTATE, SEPSIS   BRAIN NATRIURETIC PEPTIDE   TROPONIN       All other labs werewithin normal range or not returned as of this dictation. EKG:  All EKG's are interpreted by the Emergency Department Physician who either signs or Co-signs this chart in the absence of a cardiologist.  Please see their note for interpretation of EKG. RADIOLOGY:   Right rib x-ray including chest interpreted by radiologist for  Impression:    Nondisplaced fracture right 10th rib     Right parahilar linear opacities could represent scarring or infection               Interpretation per the Radiologist below, if available at the time of this note:    CT CHEST PULMONARY EMBOLISM W CONTRAST   Final Result   No evidence of a pulmonary embolus. Mildly dilated and moderately atherosclerotic thoracic aorta with no aneurysm   or dissection. Chronic obstructive lung changes with mild bibasilar atelectasis and/or   infiltrates vs parenchymal fibrosis and scarring extending into the   superiorly and into the right upper lobe      1.4 cm pleural-based rounded opacity along the right lower lobe posteriorly   which could represent an infiltrate vs mass or lung contusion. Recommend   short-term follow-up. Nondisplaced fracture of the 10th rib on the right with no effusion or   pneumothorax. Bullous emphysematous changes of the lungs with mild biapical pleural   thickening and scarring. Mild compression deformities along the upper thoracic spine which are   probably chronic. Recommend clinical follow-up. XR RIBS RIGHT INCLUDE CHEST (MIN 3 VIEWS)   Final Result   Nondisplaced fracture right 10th rib      Right parahilar linear opacities could represent scarring or infection         CT CHEST WO CONTRAST    (Results Pending)     No results found.       PROCEDURES   Unless otherwise noted below, none     Procedures     CRITICAL CARE TIME   N/A    CONSULTS:  IP CONSULT TO CASE MANAGEMENT  IP CONSULT TO HOME CARE NEEDS  IP CONSULT TO HOSPITALIST  IP CONSULT TO HOSPITALIST      EMERGENCYDEPARTMENT COURSE and DIFFERENTIAL DIAGNOSIS/MDM:   Vitals:    Vitals:    03/07/22 2218 03/08/22 0547 03/08/22 0815 03/08/22 0856   BP: 128/70 (!) 141/73 138/69    Pulse: 69 60 64    Resp: 22 20 18    Temp: 97.9 °F (36.6 °C) 96.7 °F (35.9 °C) 97.1 °F (36.2 °C)    TempSrc: Oral Oral Oral    SpO2: 96% 95% 96%    Weight: 106 lb 3.2 oz (48.2 kg)      Height: 5' 7\" (1.702 m)   5' 7\" (1.702 m)       Patient was given the following medications:  Medications   lidocaine 4 % external patch 1 patch (1 patch TransDERmal Patch Removed 3/7/22 2307)   morphine (PF) injection 2 mg (2 mg IntraVENous Given 3/7/22 1238)   iopamidol (ISOVUE-370) 76 % injection 85 mL (85 mLs IntraVENous Given 3/7/22 1821)     Patient was seen evaluated by myself and Dr. Andie Thurman. Patient here today for complaints of right-sided rib pain. Patient reports he was recently diagnosed with COVID. Patient reports his primary care doctor started him on antibiotics for potential bacterial component. Patient states that he slipped in the tub a few days ago landing on the right side of his rib cage. Patient reports he has had increased pain since then particularly with coughing. Patient denies any chest pain or shortness of breath. On exam he is awake and alert hemodynamically stable nontoxic in appearance. He does have reproducible tenderness with palpation to the right rib cage. Chest x-ray was concerning for a nondisplaced right 10th rib fracture. Patient was provided with pain medications in the ED. Patient was able to ambulate in the emergency department however he did not become hypoxic. His oxygen saturation dropped to 91 he did become a little tachycardic with a heart rate of 104 however recovered with rest.  At this point the patient does not meet criteria for admission but case management was consulted for close follow-up with the patient. Case management has been consulted to evaluate the patient for home care and close follow-up.     Physical therapy did evaluate the patient in the ED however during his evaluation the patient did become hypoxic with a oxygen saturation as low as 84% per the OT PT staff. At this point the patient's disposition was changed admission. Patient was provided with a dose of Decadron and placed on oxygen. Consult was placed to the hospitalist for admission. Hospitalist requested a CT PE prior to excepting the patient. The patient tolerated their visit well. I have evaluated this patient. My supervising physician was available for consultation. The patient and / or the family were informed of the results of any tests, a time was given to answer questions, a plan was proposed and they agreed with plan. FINAL IMPRESSION      1. Fall due to wet surface, initial encounter    2. Closed fracture of one rib of right side, initial encounter    3. History of COVID-19    4. Acute respiratory failure with hypoxia ([facility] [address]    5. COVID-19          DISPOSITION/PLAN   DISPOSITION Admitted 03/07/2022 08:03:24 PM      PATIENT REFERRED TO:  Janette Mg, [address] [facility]  [phone]    Schedule an appointment as soon as possible for a visit in 1 day  for re-evaluation    [place] [facility]  [address] [facility]  [phone]    If symptoms worsen    [address]  [address] [facility] [address]  [address] [facility] [address] [facility], [address] [place] [address] [phone]    Schedule an appointment as soon as possible for a visit        DISCHARGE MEDICATIONS:  Discharge Medication List as of 3/8/2022 12:23 PM      START taking these medications    Details   dexamethasone (DECADRON) 6 MG tablet Take 1 tablet by mouth daily (with breakfast) for 8 days, Disp-8 tablet, R-0Normal      apixaban (ELIQUIS) 2.5 MG TABS tablet Take 1 tablet by mouth 2 times daily for 14 days, Disp-28 tablet, R-0Normal      HYDROcodone-acetaminophen (NORCO) 5-325 MG per tablet Take 1 tablet by mouth every 8 hours as needed for Pain for up to 3 days. Intended supply: 3 days.  Take lowest dose possible to manage pain, Disp-12 tablet, R-0Print      lidocaine (LIDODERM) 5 % Place 1 patch onto the skin daily for 10 days 12 hours on, 12 hours off., Disp-10 patch, R-0Print      methocarbamol (ROBAXIN) 500 MG tablet Take 1 tablet by mouth in the morning and at bedtime for 5 days, Disp-10 tablet, R-0Print             DISCONTINUED MEDICATIONS:  Discharge Medication List as of 3/8/2022 12:23 PM                 (Please note that portions of this note were completed with a voice recognition program.  Efforts were made to edit the dictations but occasionally words are mis-transcribed.)    LIZET Novoa CNP (electronically signed)         LIZET Novoa CNP  03/07/22 [address] LIZET Sheldon CNP  03/07/22 [address] LIZET Sheldon CNP  03/08/22 1938

## 2022-03-07 NOTE — ED NOTES
Pt's o2 dropped to 80s while working with PT/OT, provider made aware.      Ronnie Speaker, JONO  03/07/22 9548

## 2022-03-07 NOTE — PROGRESS NOTES
Inpatient Occupational Therapy  Evaluation and Treatment    Unit: ED  Date:  3/7/2022  Patient Name:    Tanya Shay  Admitting diagnosis:  No admission diagnoses are documented for this encounter. Admit Date:  3/7/2022  Precautions/Restrictions/WB Status/ Lines/ Wounds/ Oxygen:   Fall risk, Bed/chair alarm, Lines -IV and Isolation Precautions: Droplet Plus - COVID  Treatment Time:  7130-4690  Treatment Number: 1   Timed code treatment minutes 40 minutes   Total Treatment minutes:   40  Minutes- observation     Patient Goals for Therapy:  \" not stated \"      Discharge Recommendations: Home 24 hr supervision  and Home OT/ assist as needed   DME needs for discharge: RW, Shower Chair and Reacher       Therapy recommendations for staff:   Assist of 1 with use of rolling walker (RW) for all ambulation to/from [facility]  to/from chair with gait belt     History of Present Illness:   Per ED Note 3/7/22 \"David Romero is a 80 y. o. male for right-sided rib pain. Onset was 3 days.  Context includes patient reports that he slipped on a wet tub landing on his right rib cage.  Patient reports that he has had pain for the last 3 days.  Patient reports that he recently tested positive for Covid and has been on antibiotics for bacterial pneumonia from his primary care doctor. Lillie Looney denies any fevers nausea vomiting or diarrhea. [facility] does report he has had a cough however has had pain with coughing.  He denies chest pain or shortness of breath.  Patient denies hitting his head with this fall.  He states that he slipped on a wet surface.  Alleviating factors include nothing.  Aggravating factors include nothing. Pain is 10/10.  Nothing has been used for pain today. \"  Home Health S4 Level Recommendation:  Level 1 Standard  AM-PAC Score: 22  Preadmission Environment    Pt.  Lives with spouse  Home environment:            one story home and with laundry in basement  Steps to enter first floor: 1  steps to enter and no handrail  Steps to second floor:         Full flight of 12-13 and hand rail: unilateral on right down to basement  Bathroom: tub/shower unit, walk in shower, grab bars and standard height commode  Equipment owned: shower chair/bench     Preadmission Status:  Pt. Able to drive: Yes  Pt Fully independent with ADLs: Yes  Pt. Required assistance from family for: Independent PTA; shares responsibilities with spouse  Pt. independent for transfers and gait and walked with No Device  History of falls          Yes ; fall in bathtub leading to admission; no other falls reported  Comment: Pt reports being very active; has work out equipment in basement he uses frequently, does yardwork     Pain   Yes  Location: Rib on Right  Rating: mild /10 at rest;   Pain Medicine Status: Received pain med prior to tx     Cognition    A&O x4, Person, Place, Time and Situation   Able to follow 2 step commands  Subjective  Patient lying supine in bed with family at bedside. Pt agreeable to this OT eval & tx.      Upper Extremity ROM:    WFL    Upper Extremity Strength:      WFL    Upper Extremity Sensation    WFL    Upper Extremity Proprioception:  WFL    Coordination and Tone  WFL    Balance  Functional Sitting Balance:  WFL  Functional Standing Balance:Diminished    Bed mobility:    Supine to sit:   SBA  Sit to supine:   SBA  Rolling:    Not Tested  Scooting in sitting:  Supervision  Scooting to head of bed:   Not Tested    Bridging:   Not Tested    Transfers:    Sit to stand:  SBA  Stand to sit:  SBA  Bed to chair:   Not Tested  Standard toilet: Not Tested  Bed to [facility]:  Not Tested    Dressing:      UE:   Not Tested  LE:    CGA to don shoes - leaning over increased pain     Bathing:    UE:  Not Tested  LE:  Not Tested    Eating:   Not Tested    Toileting:  Not Tested    Activity Tolerance   Pt completed therapy session with pain,SOB, fatigue    Positioning Needs:   Pt in bed, no alarm needed, positioned in proper neutral alignment and pressure relief provided. Exercise / Activities Initiated:   N/A    Patient/Family Education:   Role of OT    Assessment of Deficits: Pt seen for Occupational therapy evaluation in acute care setting. Pt demonstrated decreased Activity tolerance, ADLs, Balance , Bed mobility and Transfers. Pt functioning below baseline and will likely benefit from skilled occupational therapy services to maximize safety and independence. Goal(s) : To be met in 3 Visits:  1). Bed to toilet/BSC: Modified Independent    To be met in 5 Visits:  1). Supine to/from Sit:  Modified Independent  2). Upper Body Bathing:   Independent  3). Lower Body Bathing:   SBA  4). Upper Body Dressing:  Independent  5). Lower Body Dressing:  SBA and CGA  6). Pt to demonstrate UE exs x 15 reps with minimal cues    Rehabilitation Potential:  Good for goals listed above. Strengths for achieving goals include: Pt cooperative  Barriers to achieving goals include:  Complexity of condition     Plan: To be seen 3-5 x/wk while in acute care setting for therapeutic exercises, bed mobility, transfers, dressing, bathing, family/patient education, ADL/IADL retraining, energy conservation training.      Foster Power OTR/L 16059          If patient discharges from this facility prior to next visit, this note will serve as the Discharge Summary

## 2022-03-07 NOTE — PROGRESS NOTES
Physical Therapy  Inpatient Physical Therapy Evaluation and Treatment    Unit: ED  Date:  3/7/2022  Patient Name:    Marli Spicer  Admitting diagnosis:  No admission diagnoses are documented for this encounter. Admit Date:  3/7/2022  Precautions/Restrictions/WB Status/ Lines/ Wounds/ Oxygen: Fall risk, Bed/chair alarm, Lines -IV and Isolation Precautions: Droplet Plus - COVID    Treatment Time:  1882-7746  Treatment Number:  1   Timed Code Treatment Minutes: 10 minutes  Total Treatment Minutes:  20 minutes (40 min total; Time Split with OT)    Patient Goals for Therapy: \" to go home \"          Discharge Recommendations: Home 24 hr supervision  and Home PT  DME needs for discharge: RW       Therapy recommendation for EMS Transport: can transport by wheelchair    Therapy recommendations for staff:   Stand by assist with use of rolling walker (RW) for all transfers and ambulation to/from chair  to/from bathroom  within room    History of Present Illness: Per ED Note 3/7/22 \"David Hartman is a 80 y.o. male for right-sided rib pain. Onset was 3 days. Context includes patient reports that he slipped on a wet tub landing on his right rib cage. Patient reports that he has had pain for the last 3 days. Patient reports that he recently tested positive for Covid and has been on antibiotics for bacterial pneumonia from his primary care doctor. Patient denies any fevers nausea vomiting or diarrhea. He does report he has had a cough however has had pain with coughing. He denies chest pain or shortness of breath. Patient denies hitting his head with this fall. He states that he slipped on a wet surface. Alleviating factors include nothing. Aggravating factors include nothing. Pain is 10/10. Nothing has been used for pain today. \"    Home Health S4 Level Recommendation:  Level 1 Standard  AM-PAC Mobility Score    AM-PAC Inpatient Mobility Raw Score : 18       Preadmission Environment    Pt.  Lives with spouse  Home below  Distance:      30 ft  Deviations (firm surface/linoleum):  decreased rivka and narrow SEBASTIEN  Assistive Device Used:    No AD  Level of Assist:    CGA  Comment: Pt has mildly unsteady gait, increased time to complete due to pain. Stair Training deferred, pt unsafe/ not appropriate to complete stairs at this time    Activity Tolerance   Pt completed therapy session with Pain noted with mobility   Supine: /61, HR 86 bpm, SpO2 94% on RA  After ambulation:  bpm, SpO2 83-85% on RA    Positioning Needs   Pt in bed, alarm set, positioned in proper neutral alignment and pressure relief provided. Call light provided and all needs within reach    Exercises Initiated  Indra deferred secondary to treatment focus on functional mobility  NA    Patient/Family Education   Pt educated on role of inpatient PT, POC, importance of continued activity, DC recommendations, safety awareness, transfer techniques, pursed lip breathing, energy conservation, HEP and calling for assist with mobility. Pt educated on performing IS to promote improved lung expansion due to rib fracture    Assessment  Pt seen for Physical Therapy evaluation in acute care setting. Pt demonstrated decreased Activity tolerance, Balance and Safety as well as decreased independence with Ambulation, Bed Mobility  and Transfers. Recommending Home 24 hr supervision and with home PT upon discharge as patient functioning below baseline level and would benefit from continued therapy services    Goals : To be met in 3 visits:  1). Independent with LE Ex x 10 reps    To be met in 6 visits:  1). Supine to/from sit: Independent  2). Sit to/from stand: Independent  3). Bed to chair: Independent  4). Gait: Ambulate 150 ft.  with  Supervision and use of LRAD (least restrictive assistive device)  5). Tolerate B LE exercises 3 sets of 10-15 reps  6).   Ascend/descend 1 steps with Supervision with use of no handrail and No AD    Rehabilitation Potential: Good  Strengths for achieving goals include:   Pt motivated, PLOF, Family Support and Pt cooperative   Barriers to achieving goals include:    Pain    Plan    To be seen 2-3 x / week  while in acute care setting for therapeutic exercises, bed mobility, transfers, progressive gait training, balance training, and family/patient education. Signature: Tiffany Ricks, PT, DPT      If patient discharges from this facility prior to next visit, this note will serve as the Discharge Summary.

## 2022-03-07 NOTE — CARE COORDINATION
Writer received consult for care transitions vrs hhc at d/c and pt appropriate for hhc. left vm for [facility] with [facility] for SN,PT/OT. Awaiting call back from [facility] to set up hhc at d/c today. Charles Joya spoke with Francisco Melgar from [facility] and she will set up hhc at d/c today and she is aware pt will need to be seen within first 24-48 hrs of d/c. Pt was given pulse ox for home and does not qualify for home O2 at this time. See nursing notes, walk test. No other d/c needs voiced or identified.

## 2022-03-07 NOTE — CARE COORDINATION
Referral sent via direct link to [facility]. Electronically signed by Roya Palomino LPN on 3/6/39 at 7:08 PM EST

## 2022-03-07 NOTE — DISCHARGE INSTR - COC
Continuity of Care Form    Patient Name: Daniela Rodriguez   :  1940  MRN:  [id number]    Admit date:  3/7/2022  Discharge date:  3/8/22    Code Status Order: No Order   Advance Directives:      Admitting Physician:  No admitting provider for patient encounter. PCP: Jeannette Magallon MD    Discharging Nurse: [facility] Unit/Room#:   Discharging Unit Phone Number: [phone]      Emergency Contact:   Extended Emergency Contact Information  Primary Emergency Contact: Lola Romero  Address: [address] [place] [address] [address]  Phone: [phone]  Work Phone: [phone]  Relation: Spouse    Past Surgical History:  Past Surgical History:   Procedure Laterality Date    COLONOSCOPY  2002    COLONOSCOPY  2011    CYSTOSCOPY N/A 2021    URETHRAL DILATATION    CYSTOSCOPY N/A 2021    CYSTOSCOPY, URETHRAL DILATATION performed by Walt Chavez MD at [facility] OR       Immunization History:   Immunization History   Administered Date(s) Administered    Td, unspecified formulation 10/10/2013       Active Problems: There is no problem list on file for this patient.       Isolation/Infection:   Isolation            No Isolation          Patient Infection Status       Infection Onset Added Last Indicated Last Indicated By Review Planned Expiration Resolved Resolved By    None active    Resolved    COVID-19 (Rule Out) 06/10/21 06/10/21 06/10/21 COVID-19 (Ordered)   21 Rule-Out Test Resulted            Nurse Assessment:  Last Vital Signs: BP (!) 168/73   Pulse 85   Temp 98.8 °F (37.1 °C) (Oral)   Resp 22   Ht 5' 7\" (1.702 m)   Wt 114 lb (51.7 kg)   SpO2 90%   BMI 17.85 kg/m²     Last documented pain score (0-10 scale): Pain Level: 2  Last Weight:   Wt Readings from Last 1 Encounters:   22 114 lb (51.7 kg)     Mental Status:  oriented and alert    IV Access:  - None    Nursing Mobility/ADLs:  Walking Assisted  Transfer  Assisted  Bathing  Assisted  Dressing  Independent  Toileting  Independent  Feeding  Independent  Med [address]  Assisted  Med Delivery   whole    Wound Care Documentation and Therapy:        Elimination:  Continence: Bowel: Yes  Bladder: Yes  Urinary Catheter: None   Colostomy/Ileostomy/Ileal Conduit: no       Date of Last BM: 3/7/22  No intake or output data in the 24 hours ending 03/07/22 1515  No intake/output data recorded. Safety Concerns:     History of Falls (last 30 days) and At Risk for Falls    Impairments/Disabilities:      None    Nutrition Therapy:  Current Nutrition Therapy:   - Oral Diet:  General and vegan    Routes of Feeding: Oral  Liquids: Thin Liquids  Daily Fluid Restriction: no  Last Modified Barium Swallow with Video (Video Swallowing Test): not done    Treatments at the Time of Hospital Discharge:   Respiratory Treatments: none  Oxygen Therapy:  is not on home oxygen therapy.   Ventilator:    - No ventilator support    Rehab Therapies: SN PT OT  Weight Bearing Status/Restrictions: No weight bearing restirctions  Other Medical Equipment (for information only, NOT a DME order):  none   Other Treatments: none    Patient's personal belongings (please select all that are sent with patient):  Personal belongings sent home with patient     RN SIGNATURE:  Electronically signed by Karishma Schroeder RN on 3/8/22 at 12:22 PM EST    CASE MANAGEMENT/SOCIAL WORK SECTION    Inpatient Status Date:     Readmission Risk Assessment Score:  Readmission Risk              Risk of Unplanned Readmission:  0           Discharging to Facility/ Agency   Name:  [facility] care    Address: [address] [place] [address], [address] [place] [address]  Phone: [phone]  Fax: [phone]      / signature: Electronically signed by Dianah Sandhoff, RN on 3/7/22 at 3:16 PM EST    PHYSICIAN SECTION    Prognosis: {Prognosis:1720214437}    Condition at Discharge: [address] Patient Condition:388624070}    Rehab Potential (if transferring to Rehab): {Prognosis:4969191279}    Recommended Labs or Other Treatments After Discharge: ***    Physician Certification: I certify the above information and transfer of Hermila Phillips  is necessary for the continuing treatment of the diagnosis listed and that he requires {Admit to Appropriate Level of Care:61279} for {GREATER/LESS:169463051} 30 days.      Update Admission H&P: {CHP DME Changes in DOJ:207459179}    PHYSICIAN SIGNATURE:  Electronically signed by TITUS Ramos, RN on 3/7/22 at 3:15 PM EST

## 2022-03-07 NOTE — ED PROVIDER NOTES
I independently examined and evaluated Raul Seymour. All diagnostic, treatment, and disposition decisions were made by myself in conjunction with the RAMESH, Juliet Ahumada. For all further details of the patient's emergency department visit, please see their documentation. 72-year-old male presents after he fell in the bathtub at home this past Friday. This past Tuesday, 6 days ago he started to feel ill at home. His wife recently had COVID. He is not vaccinated for Covid. He went on Friday to his primary care office and was diagnosed with Covid. He was taking a bath on Friday and while getting out of the bath his arms slipped while raising himself up and he fell about 2 feet striking his right ribs on the edge of the bathtub. He did not hit his head. No loss of consciousness. He states he has had some right-sided rib pain since then. He states he has not had much appetite this past week and has not been eating and drinking normally. He reports a cough but no fever. No vomiting or diarrhea. Vitals:    03/07/22 1603   BP: 124/61   Pulse: 84   Resp: 25   Temp:    SpO2: (!) 88%     The patient is in no acute distress. He does have some splinting respirations but no respiratory distress. Heart is regular rate and rhythm.   Results for orders placed or performed during the hospital encounter of 03/07/22   CBC with Auto Differential   Result Value Ref Range    WBC 3.8 (L) 4.0 - 11.0 K/uL    RBC 4.27 4.20 - 5.90 M/uL    Hemoglobin 13.3 (L) 13.5 - 17.5 g/dL    Hematocrit 38.3 (L) 40.5 - 52.5 %    MCV 89.7 80.0 - 100.0 fL    MCH 31.0 26.0 - 34.0 pg    MCHC 34.6 31.0 - 36.0 g/dL    RDW 12.7 12.4 - 15.4 %    Platelets 247 143 - 535 K/uL    MPV 8.4 5.0 - 10.5 fL    Neutrophils % 77.1 %    Lymphocytes % 8.8 %    Monocytes % 13.5 %    Eosinophils % 0.2 %    Basophils % 0.4 %    Neutrophils Absolute 3.0 1.7 - 7.7 K/uL    Lymphocytes Absolute 0.3 (L) 1.0 - 5.1 K/uL    Monocytes Absolute 0.5 0.0 - 1.3 K/uL    Eosinophils Absolute 0.0 0.0 - 0.6 K/uL    Basophils Absolute 0.0 0.0 - 0.2 K/uL   Comprehensive Metabolic Panel w/ Reflex to MG   Result Value Ref Range    Sodium 132 (L) 136 - 145 mmol/L    Potassium reflex Magnesium 4.0 3.5 - 5.1 mmol/L    Chloride 94 (L) 99 - 110 mmol/L    CO2 30 21 - 32 mmol/L    Anion Gap 8 3 - 16    Glucose 92 70 - 99 mg/dL    BUN 14 7 - 20 mg/dL    CREATININE 0.8 0.8 - 1.3 mg/dL    GFR Non-African American >60 >60    GFR African American >60 >60    Calcium 8.9 8.3 - 10.6 mg/dL    Total Protein 6.9 6.4 - 8.2 g/dL    Albumin 3.5 3.4 - 5.0 g/dL    Albumin/Globulin Ratio 1.0 (L) 1.1 - 2.2    Total Bilirubin <0.2 0.0 - 1.0 mg/dL    Alkaline Phosphatase 70 40 - 129 U/L    ALT 18 10 - 40 U/L    AST 35 15 - 37 U/L   Procalcitonin   Result Value Ref Range    Procalcitonin 0.10 0.00 - 0.15 ng/mL   Lactate, Sepsis   Result Value Ref Range    Lactic Acid, Sepsis 1.2 0.4 - 1.9 mmol/L   Urinalysis with Reflex to Culture    Specimen: Urine   Result Value Ref Range    Color, UA Yellow Straw/Yellow    Clarity, UA Clear Clear    Glucose, Ur Negative Negative mg/dL    Bilirubin Urine Negative Negative    Ketones, Urine Negative Negative mg/dL    Specific Gravity, UA 1.010 1.005 - 1.030    Blood, Urine Negative Negative    pH, UA 7.0 5.0 - 8.0    Protein, UA TRACE (A) Negative mg/dL    Urobilinogen, Urine 0.2 <2.0 E.U./dL    Nitrite, Urine Negative Negative    Leukocyte Esterase, Urine Negative Negative    Microscopic Examination YES     Urine Type NotGiven     Urine Reflex to Culture Not Indicated    Brain Natriuretic Peptide   Result Value Ref Range    Pro- 0 - 449 pg/mL   Troponin   Result Value Ref Range    Troponin <0.01 <0.01 ng/mL   Microscopic Urinalysis   Result Value Ref Range    Mucus, UA 1+ (A) None Seen /LPF    WBC, UA 0-2 0 - 5 /HPF    RBC, UA None seen 0 - 4 /HPF    Epithelial Cells, UA 2-5 0 - 5 /HPF   EKG 12 Lead   Result Value Ref Range    Ventricular Rate 83 BPM    Atrial Rate 83 BPM    P-R Interval 142 ms    QRS Duration 70 ms    Q-T Interval 364 ms    QTc Calculation (Bazett) 427 ms    P Axis 79 degrees    R Axis 15 degrees    T Axis 77 degrees    Diagnosis       Normal sinus rhythmNormal ECGConfirmed by SYED Rico MD ([id number]) on 3/7/2022 2:42:48 PM       XR RIBS RIGHT INCLUDE CHEST (MIN 3 VIEWS)   Final Result   Nondisplaced fracture right 10th rib      Right parahilar linear opacities could represent scarring or infection               EKG  The Ekg interpreted by myself  normal sinus rhythm with a rate of 83  Axis is   Normal  QTc is  normal  Intervals and Durations are unremarkable. No specific ST-T wave changes appreciated. No evidence of acute ischemia. No prior EKG for comparison. Here the patient has resting room air sats of 93%. He drops to 91% with ambulation. He is in no acute distress. He does have a nondisplaced right 10th rib fracture. No pneumothorax. Troponin and lactic acid are unremarkable, lab work is reassuring. He has been given an incentive spirometer to use at home. Given that his sats do drop to 91% with ambulation I do not think he needs home oxygen but does need close follow-up. We have placed a dispatch health consult to help ensure the patient has appropriate follow-up at home. He is Covid positive and unvaccinated so he has the potential to worsen. Mony Holland MD  03/07/22 0860      Of note PT/OT came to evaluate the patient when they were working with the patient and his oxygen saturations dropped to 84%.   In light of this we will admit the patient to the hospitalist.     Mony Holland MD  03/07/22 4787

## 2022-03-08 ENCOUNTER — CARE COORDINATION (OUTPATIENT)
Dept: CARE COORDINATION | Age: 82
End: 2022-03-08

## 2022-03-08 VITALS
TEMPERATURE: 97.1 F | SYSTOLIC BLOOD PRESSURE: 138 MMHG | HEIGHT: 67 IN | HEART RATE: 64 BPM | RESPIRATION RATE: 18 BRPM | BODY MASS INDEX: 16.67 KG/M2 | WEIGHT: 106.2 LBS | DIASTOLIC BLOOD PRESSURE: 69 MMHG | OXYGEN SATURATION: 96 %

## 2022-03-08 LAB
A/G RATIO: 0.9 (ref 1.1–2.2)
ALBUMIN SERPL-MCNC: 3.3 G/DL (ref 3.4–5)
ALP BLD-CCNC: 70 U/L (ref 40–129)
ALT SERPL-CCNC: 15 U/L (ref 10–40)
ANION GAP SERPL CALCULATED.3IONS-SCNC: 7 MMOL/L (ref 3–16)
AST SERPL-CCNC: 30 U/L (ref 15–37)
BASOPHILS ABSOLUTE: 0 K/UL (ref 0–0.2)
BASOPHILS RELATIVE PERCENT: 0.4 %
BILIRUB SERPL-MCNC: 0.3 MG/DL (ref 0–1)
BUN BLDV-MCNC: 13 MG/DL (ref 7–20)
CALCIUM SERPL-MCNC: 9.2 MG/DL (ref 8.3–10.6)
CHLORIDE BLD-SCNC: 95 MMOL/L (ref 99–110)
CO2: 31 MMOL/L (ref 21–32)
CREAT SERPL-MCNC: 0.7 MG/DL (ref 0.8–1.3)
EOSINOPHILS ABSOLUTE: 0 K/UL (ref 0–0.6)
EOSINOPHILS RELATIVE PERCENT: 0 %
FERRITIN: 636.5 NG/ML (ref 30–400)
GFR AFRICAN AMERICAN: >60
GFR NON-AFRICAN AMERICAN: >60
GLUCOSE BLD-MCNC: 137 MG/DL (ref 70–99)
HCT VFR BLD CALC: 37.4 % (ref 40.5–52.5)
HEMOGLOBIN: 12.8 G/DL (ref 13.5–17.5)
LYMPHOCYTES ABSOLUTE: 0.3 K/UL (ref 1–5.1)
LYMPHOCYTES RELATIVE PERCENT: 13.9 %
MCH RBC QN AUTO: 30.6 PG (ref 26–34)
MCHC RBC AUTO-ENTMCNC: 34.3 G/DL (ref 31–36)
MCV RBC AUTO: 89.3 FL (ref 80–100)
MONOCYTES ABSOLUTE: 0.2 K/UL (ref 0–1.3)
MONOCYTES RELATIVE PERCENT: 7.2 %
NEUTROPHILS ABSOLUTE: 1.9 K/UL (ref 1.7–7.7)
NEUTROPHILS RELATIVE PERCENT: 78.5 %
PDW BLD-RTO: 12.6 % (ref 12.4–15.4)
PLATELET # BLD: 168 K/UL (ref 135–450)
PMV BLD AUTO: 8 FL (ref 5–10.5)
POTASSIUM REFLEX MAGNESIUM: 4.7 MMOL/L (ref 3.5–5.1)
RBC # BLD: 4.18 M/UL (ref 4.2–5.9)
SODIUM BLD-SCNC: 133 MMOL/L (ref 136–145)
TOTAL PROTEIN: 6.9 G/DL (ref 6.4–8.2)
WBC # BLD: 2.4 K/UL (ref 4–11)

## 2022-03-08 PROCEDURE — 85025 COMPLETE CBC W/AUTO DIFF WBC: CPT

## 2022-03-08 PROCEDURE — 6370000000 HC RX 637 (ALT 250 FOR IP): Performed by: HOSPITALIST

## 2022-03-08 PROCEDURE — 6360000002 HC RX W HCPCS: Performed by: HOSPITALIST

## 2022-03-08 PROCEDURE — 80053 COMPREHEN METABOLIC PANEL: CPT

## 2022-03-08 PROCEDURE — 2580000003 HC RX 258: Performed by: HOSPITALIST

## 2022-03-08 PROCEDURE — 99239 HOSP IP/OBS DSCHRG MGMT >30: CPT | Performed by: INTERNAL MEDICINE

## 2022-03-08 PROCEDURE — 36415 COLL VENOUS BLD VENIPUNCTURE: CPT

## 2022-03-08 RX ORDER — DEXAMETHASONE 6 MG/1
6 TABLET ORAL
Qty: 8 TABLET | Refills: 0 | Status: SHIPPED | OUTPATIENT
Start: 2022-03-08 | End: 2022-03-16

## 2022-03-08 RX ORDER — AZITHROMYCIN 250 MG/1
250 TABLET, FILM COATED ORAL DAILY
Status: DISCONTINUED | OUTPATIENT
Start: 2022-03-08 | End: 2022-03-08 | Stop reason: HOSPADM

## 2022-03-08 RX ADMIN — Medication 10 ML: at 08:19

## 2022-03-08 RX ADMIN — ENOXAPARIN SODIUM 30 MG: 100 INJECTION SUBCUTANEOUS at 08:20

## 2022-03-08 RX ADMIN — Medication 1 TABLET: at 08:19

## 2022-03-08 RX ADMIN — DEXAMETHASONE SODIUM PHOSPHATE 6 MG: 10 INJECTION INTRAMUSCULAR; INTRAVENOUS at 12:06

## 2022-03-08 ASSESSMENT — ENCOUNTER SYMPTOMS
RHINORRHEA: 0
FACIAL SWELLING: 0
SORE THROAT: 0
ABDOMINAL PAIN: 0
COLOR CHANGE: 0
SHORTNESS OF BREATH: 0

## 2022-03-08 ASSESSMENT — PAIN SCALES - GENERAL
PAINLEVEL_OUTOF10: 0
PAINLEVEL_OUTOF10: 0

## 2022-03-08 NOTE — H&P
Hospital Medicine History & Physical      PCP: Chucky Guidry MD    Date of Admission: 3/7/2022    Date of Service: Pt seen/examined on 3/7/22 and Admitted to Inpatient with expected LOS greater than two midnights due to medical therapy. Chief Complaint:  SOB      History Of Present Illness:       80 y.o. male presents with generalized weakness, change in taste, nausea with chills and malaise since onset 6 days ago. He fell in the bathroom attempting to get out of the tub, presented to the ER where he tested (+) for covid and was found to have a non displaced rib fx. SOB / chest wall pain persisted prompting ER consultation. Past Medical History:          Diagnosis Date    BPH (benign prostatic hyperplasia)     Cataract     COVID 03/04/2022    Fatty liver 2011    Glaucoma     Heart murmur after rheumatic heart disease     Hyperlipidemia     Hypertension     Left rotator cuff tear     Pneumothorax     per pt    Rheumatic fever     when pt was young    Strep throat        Past Surgical History:          Procedure Laterality Date    COLONOSCOPY  03/08/2002    COLONOSCOPY  12/06/2011    CYSTOSCOPY N/A 06/16/2021    URETHRAL DILATATION    CYSTOSCOPY N/A 6/16/2021    CYSTOSCOPY, URETHRAL DILATATION performed by Janice Martinez MD at [address] [place] [address] Drive         Medications Prior to Admission:      Prior to Admission medications    Medication Sig Start Date End Date Taking? Authorizing Provider   HYDROcodone-acetaminophen (NORCO) 5-325 MG per tablet Take 1 tablet by mouth every 8 hours as needed for Pain for up to 3 days. Intended supply: 3 days.  Take lowest dose possible to manage pain 3/7/22 3/10/22 Yes LIZET Chavez CNP   lidocaine (LIDODERM) 5 % Place 1 patch onto the skin daily for 10 days 12 hours on, 12 hours off. 3/7/22 3/17/22 Yes LIZET Chavez CNP   methocarbamol (ROBAXIN) 500 MG tablet Take 1 tablet by mouth in the morning and at bedtime for 5 days 3/7/22 3/12/22 Yes Kym Gunn, APRN - CNP   Multiple Vitamins-Minerals (THERAPEUTIC MULTIVITAMIN-MINERALS) tablet Take 1 tablet by mouth daily   Yes Historical Provider, MD   Omega-3 Fatty Acids (FISH OIL) 1000 MG CAPS Take 1,000 mg by mouth daily   Yes Historical Provider, MD   vitamin B-12 (CYANOCOBALAMIN) 1000 MCG tablet Take 1,000 mcg by mouth Twice a Week   Yes Historical Provider, MD       Allergies:  Amoxicillin    Social History:       TOBACCO:   reports that he quit smoking about 39 years ago. He has never used smokeless tobacco.  ETOH:   reports previous alcohol use. Family History:               Problem Relation Age of Onset    Heart Disease Father        REVIEW OF SYSTEMS:   Pertinent positives as noted in the HPI. All other systems reviewed and negative. PHYSICAL EXAM PERFORMED:    /70   Pulse 69   Temp 97.9 °F (36.6 °C) (Oral)   Resp 22   Ht 5' 7\" (1.702 m)   Wt 106 lb 3.2 oz (48.2 kg)   SpO2 96%   BMI 16.63 kg/m²     General appearance:  No apparent distress, appears stated age and cooperative. HEENT:  Normal cephalic, atraumatic without obvious deformity. Pupils equal, round, and reactive to light. Extra ocular muscles intact. Conjunctivae/corneas clear. Neck: Supple, with full range of motion. No jugular venous distention. Trachea midline. Respiratory:  Normal respiratory effort. Speaking in full sentences, no use of accessory muscles  Cardiovascular:  Regular rate and rhythm    Abdomen: Soft, non-tender, non-distended   Musculoskeletal:  No clubbing, cyanosis or edema bilaterally. No calf tenderness   Skin: Skin color, texture, turgor normal.  No rashes or lesions.   Neurologic:  grossly non-focal.  Psychiatric:  Alert and oriented, thought content appropriate, normal insight         Labs:     Recent Labs     03/07/22  1157   WBC 3.8*   HGB 13.3*   HCT 38.3*        Recent Labs     03/07/22  1157   *   K 4.0   CL 94* CO2 30   BUN 14   CREATININE 0.8   CALCIUM 8.9     Recent Labs     03/07/22  1157   AST 35   ALT 18   BILITOT <0.2   ALKPHOS 70     No results for input(s): INR in the last 72 hours. Recent Labs     03/07/22  1157   TROPONINI <0.01       Urinalysis:      Lab Results   Component Value Date    NITRU Negative 03/07/2022    WBCUA 0-2 03/07/2022    RBCUA None seen 03/07/2022    BLOODU Negative 03/07/2022    SPECGRAV 1.010 03/07/2022    GLUCOSEU Negative 03/07/2022       Radiology:          CT CHEST PULMONARY EMBOLISM W CONTRAST   Final Result   No evidence of a pulmonary embolus. Mildly dilated and moderately atherosclerotic thoracic aorta with no aneurysm   or dissection. Chronic obstructive lung changes with mild bibasilar atelectasis and/or   infiltrates vs parenchymal fibrosis and scarring extending into the   superiorly and into the right upper lobe      1.4 cm pleural-based rounded opacity along the right lower lobe posteriorly   which could represent an infiltrate vs mass or lung contusion. Recommend   short-term follow-up. Nondisplaced fracture of the 10th rib on the right with no effusion or   pneumothorax. Bullous emphysematous changes of the lungs with mild biapical pleural   thickening and scarring. Mild compression deformities along the upper thoracic spine which are   probably chronic. Recommend clinical follow-up. XR RIBS RIGHT INCLUDE CHEST (MIN 3 VIEWS)   Final Result   Nondisplaced fracture right 10th rib      Right parahilar linear opacities could represent scarring or infection             ASSESSMENT:    Active Hospital Problems    Diagnosis Date Noted    COVID-19 [U07.1] 03/07/2022         PLAN:    1) Covid   - started on decadron, on 1 lpm NC O2, stable    2) chest pain / Rib fx  - pain controlled  - neg ctpa for PE  - prn norco ordered    DVT Prophylaxis: lovenox  Diet: ADULT DIET;  Regular; Vegan  Code Status: Full Code       [facility]    Thank you Chantelle Narvaez MD for the opportunity to be involved in this patient's care. If you have any questions or concerns please feel free to contact me at [phone].

## 2022-03-08 NOTE — PROGRESS NOTES
Patient is able to demonstrate the ability to move from a reclining position to an upright position within the recliner. Bedside Mobility Assessment Tool (BMAT):     Assessment Level 1- Sit and Shake    1. From a semi-reclined position, ask patient to sit up and rotate to a seated position at the side of the bed. Can use the bedrail. 2. Ask patient to reach out and grab your hand and shake making sure patient reaches across his/her midline. Pass- Patient is able to come to a seated position, maintain core strength. Maintains seated balance while reaching across midline. Move on to Assessment Level 2. Assessment Level 2- Stretch and Point   1. With patient in seated position at the side of the bed, have patient place both feet on the floor (or stool) with knees no higher than hips. 2. Ask patient to stretch one leg and straighten the knee, then bend the ankle/flex and point the toes. If appropriate, repeat with the other leg. Pass- Patient is able to demonstrate appropriate quad strength on intended weight bearing limb(s). Move onto Assessment Level 3. Assessment Level 3- Stand   1. Ask patient to elevate off the bed or chair (seated to standing) using an assistive device (cane, bedrail). 2. Patient should be able to raise buttocks off be and hold for a count of five. May repeat once. Pass- Patient maintains standing stability for at least 5 seconds, proceed to assessment level 4. Assessment Level 4- Walk   1. Ask patient to march in place at bedside. 2. Then ask patient to advance step and return each foot. Some medical conditions may render a patient from stepping backwards, use your best clinical judgement. Pass- Patient demonstrates balance while shifting weight and ability to step, takes independent steps, does not use assistive device patient is MOBILITY LEVEL 4.       Mobility Level- 4

## 2022-03-08 NOTE — PROGRESS NOTES
Discharge instructions gone over with patient telemetry and IV removed waiting for ride home at this time   Walk test performed patients 02 sat on room air at rest is 96% and 02 sat on room air with activity is 92% slight SOB noted

## 2022-03-08 NOTE — CARE COORDINATION
ACM attempted outreach. Home number is not a working number. Wife's contact number is not available. No Hippa on file. My chart message sent for outreach. I outreached to the PCP- Candie Mehta and was provided a contact number   [phone]. Spoke with the patient on his way home from the ER. He asked that I call  tomorrow for an outreach.

## 2022-03-08 NOTE — PROGRESS NOTES
4 Eyes Skin Assessment     The patient is being assess for   Admission    I agree that 2 RN's have performed a thorough Head to Toe Skin Assessment on the patient. ALL assessment sites listed below have been assessed. Areas assessed for pressure by both nurses:   [x]   Head, Face, and Ears   [x]   Shoulders, Back, and Chest, Abdomen  [x]   Arms, Elbows, and Hands   [x]   Coccyx, Sacrum, and Ischium  [x]   Legs, Feet, and Heels        No skin issues. **SHARE this note so that the co-signing nurse is able to place an eSignature**    Co-signer eSignature: Electronically signed by Glory Witt RN on 3/8/22 at 6:01 AM EST    Does the Patient have Skin Breakdown related to pressure?   No   Mal Prevention initiated:  No   Wound Care Orders initiated:  No      [facility] nurse consulted for Pressure Injury (Stage 3,4, Unstageable, DTI, NWPT, Complex wounds)and New or Established Ostomies:  No      Primary Nurse eSignature: Electronically signed by Michelle Lane RN on 3/8/22 at 6:01 AM EST

## 2022-03-08 NOTE — PROGRESS NOTES
/69   Pulse 64   Temp 97.1 °F (36.2 °C) (Oral)   Resp 18   Ht 5' 7\" (1.702 m)   Wt 106 lb 3.2 oz (48.2 kg)   SpO2 96%   BMI 16.63 kg/m²  patient sitting up in bed eating breakfast alert and oriented remains in isolation for covid continues to be on room air with 02 sat of 96% no cough or congestion noted patient wanting to go home today IV site to RFA saline locked site unremarkable denies pain and discomfort

## 2022-03-08 NOTE — ED PROVIDER NOTES
I briefly saw this patient and I was involved in their care only and that I reported their CT results to them. I coordinated the finalization of the admission to hospitalist, admission previously coordinated between [facility] and [facility] hospitalist team.    The primary encounter diagnosis was Fall due to wet surface, initial encounter. Diagnoses of Closed fracture of one rib of right side, initial encounter, History of COVID-19, and Acute respiratory failure with hypoxia ([facility] [address] were also pertinent to this visit.          [address]  03/07/22 0197

## 2022-03-08 NOTE — ACP (ADVANCE CARE PLANNING)
Advance Care Planning     General Advance Care Planning (ACP) Conversation    Date of Conversation: 3/7/2022  Conducted with: Patient with Decision Making Capacity    Healthcare Decision Maker:    Primary Decision Maker: Lola Romero - [facility] - [phone]  Click here to complete Healthcare Decision Makers including selection of the Healthcare Decision Maker Relationship (ie \"Primary\"). Today we documented Decision Maker(s) consistent with Legal Next of Kin hierarchy. Content/Action Overview: Has ACP document(s) NOT on file - requested patient to provide  Reviewed DNR/DNI and patient elects Full Code (Attempt Resuscitation)  ventilation preferences and resuscitation preferences  Pt elects full code and wants cpr if heart should stop and intubation if indicated.     Length of Voluntary ACP Conversation in minutes:  <16 minutes (Non-Billable)    Masoud Bloom RN

## 2022-03-08 NOTE — ED NOTES
Writer called Abe Samuels, pt's wife, and updated her with pt's room status.      Rachel Bradley RN  03/07/22 2041

## 2022-03-08 NOTE — ED NOTES
Lab called critical- pt is Covid positive. Provider aware.      Vikki Fontanez, JONO  03/07/22 [address], RN  03/07/22 7665

## 2022-03-08 NOTE — DISCHARGE SUMMARY
Hospital Medicine Discharge Summary    Patient ID: Tara Reyes      Patient's PCP: Marc Dietz MD    Admit Date: 3/7/2022     Discharge Date:   3/8/2022    Admitting Provider: Mark Will MD     Discharge Provider: Chito Dasilva MD     Discharge Diagnoses: Active Hospital Problems    Diagnosis     COVID-19 [U07.1]        The patient was seen and examined on day of discharge and this discharge summary is in conjunction with any daily progress note from day of discharge. Hospital Course: 73yo man - unvaccinated, COPD, but generally healthy, hx of macular degeneration, BPH, active and independent with ADLs, presented with SOB. Pt reports he fell trying to get out of the bathtub on Friday. Came to ED - tested for COVID, but has not gotten the results until yesterday. Advised to return to ED for reevaluation. COVID 19 Infection. CTA no PE and no PNA. Required oxygen transiently - now off. Treated with decadron and Ppx AC. Cont supportive care - improved. R 10th rib fracture. Mechanical fall getting out of the bathtub. Pain controlled. Ordered IS. Abnormal CT chest      1.4 cm pleural-based rounded opacity along the right lower lobe posteriorly   which could represent an infiltrate vs mass or lung contusion.  Recommend   short-term follow-up. Also with fairly extensive COPD findings. Discussed with pt at length. Will need short term follow up CT - ordered in Epic. Physical Exam Performed:     /69   Pulse 64   Temp 97.1 °F (36.2 °C) (Oral)   Resp 18   Ht 5' 7\" (1.702 m)   Wt 106 lb 3.2 oz (48.2 kg)   SpO2 96%   BMI 16.63 kg/m²       General appearance:  No apparent distress, appears stated age and cooperative. HEENT:  Normal cephalic, atraumatic without obvious deformity. Pupils equal, round, and reactive to light. Extra ocular muscles intact. Conjunctivae/corneas clear. Neck: Supple, with full range of motion.  No jugular venous distention. Trachea midline. Respiratory:  Normal respiratory effort. Clear to auscultation, bilaterally without Rales/Wheezes/Rhonchi. Cardiovascular:  Regular rate and rhythm with normal S1/S2 without murmurs, rubs or gallops. Abdomen: Soft, non-tender, non-distended with normal bowel sounds. Musculoskeletal:  No clubbing, cyanosis or edema bilaterally. Full range of motion without deformity. Skin: Skin color, texture, turgor normal.  No rashes or lesions. Neurologic:  Neurovascularly intact without any focal sensory/motor deficits. Cranial nerves: II-XII intact, grossly non-focal.  Psychiatric:  Alert and oriented, thought content appropriate, normal insight  Capillary Refill: Brisk,< 3 seconds   Peripheral Pulses: +2 palpable, equal bilaterally       Labs: For convenience and continuity at follow-up the following most recent labs are provided:      CBC:    Lab Results   Component Value Date    WBC 2.4 03/08/2022    HGB 12.8 03/08/2022    HCT 37.4 03/08/2022     03/08/2022       Renal:    Lab Results   Component Value Date     03/08/2022    K 4.7 03/08/2022    CL 95 03/08/2022    CO2 31 03/08/2022    BUN 13 03/08/2022    CREATININE 0.7 03/08/2022    CALCIUM 9.2 03/08/2022         Significant Diagnostic Studies    Radiology:   CT CHEST PULMONARY EMBOLISM W CONTRAST   Final Result   No evidence of a pulmonary embolus. Mildly dilated and moderately atherosclerotic thoracic aorta with no aneurysm   or dissection. Chronic obstructive lung changes with mild bibasilar atelectasis and/or   infiltrates vs parenchymal fibrosis and scarring extending into the   superiorly and into the right upper lobe      1.4 cm pleural-based rounded opacity along the right lower lobe posteriorly   which could represent an infiltrate vs mass or lung contusion. Recommend   short-term follow-up. Nondisplaced fracture of the 10th rib on the right with no effusion or   pneumothorax. Bullous emphysematous changes of the lungs with mild biapical pleural   thickening and scarring. Mild compression deformities along the upper thoracic spine which are   probably chronic. Recommend clinical follow-up. XR RIBS RIGHT INCLUDE CHEST (MIN 3 VIEWS)   Final Result   Nondisplaced fracture right 10th rib      Right parahilar linear opacities could represent scarring or infection         CT CHEST WO CONTRAST    (Results Pending)          Consults:     IP CONSULT TO CASE MANAGEMENT  IP CONSULT TO HOME CARE NEEDS  IP CONSULT TO HOSPITALIST  IP CONSULT TO HOSPITALIST    Disposition: home     Condition at Discharge: Stable    Discharge Instructions/Follow-up:  PCP 1 week. Code Status:  Full Code     Activity: activity as tolerated    Diet: regular diet      Discharge Medications:     Current Discharge Medication List           Details   dexamethasone (DECADRON) 6 MG tablet Take 1 tablet by mouth daily (with breakfast) for 8 days  Qty: 8 tablet, Refills: 0      apixaban (ELIQUIS) 2.5 MG TABS tablet Take 1 tablet by mouth 2 times daily for 14 days  Qty: 28 tablet, Refills: 0      HYDROcodone-acetaminophen (NORCO) 5-325 MG per tablet Take 1 tablet by mouth every 8 hours as needed for Pain for up to 3 days. Intended supply: 3 days. Take lowest dose possible to manage pain  Qty: 12 tablet, Refills: 0    Associated Diagnoses: Closed fracture of one rib of right side, initial encounter; History of COVID-19      lidocaine (LIDODERM) 5 % Place 1 patch onto the skin daily for 10 days 12 hours on, 12 hours off.   Qty: 10 patch, Refills: 0      methocarbamol (ROBAXIN) 500 MG tablet Take 1 tablet by mouth in the morning and at bedtime for 5 days  Qty: 10 tablet, Refills: 0              Details   Multiple Vitamins-Minerals (THERAPEUTIC MULTIVITAMIN-MINERALS) tablet Take 1 tablet by mouth daily      Omega-3 Fatty Acids (FISH OIL) 1000 MG CAPS Take 1,000 mg by mouth daily      vitamin B-12 (CYANOCOBALAMIN) 1000 MCG tablet Take 1,000 mcg by mouth Twice a Week             Time Spent on discharge is more than 30 minutes in the examination, evaluation, counseling and review of medications and discharge plan. Signed:    Jonathan Evans MD   3/8/2022      Thank you Abril Hirsch MD for the opportunity to be involved in this patient's care. If you have any questions or concerns please feel free to contact me at [phone].

## 2022-03-08 NOTE — CARE COORDINATION
[facility] aware that patient admitted to [facility] instead of discharge. Will continue to follow. Electronically signed by Whitney Almaraz LPN on 3/1/10 at 4:38 AM EST

## 2022-03-08 NOTE — DISCHARGE INSTR - DIET
Good nutrition is important when healing from an illness, injury, or surgery. Follow any nutrition recommendations given to you during your hospital stay. If you were given an oral nutrition supplement while in the hospital, continue to take this supplement at home. You can take it with meals, in-between meals, and/or before bedtime. These supplements can be purchased at most local grocery stores, pharmacies, and chain [employer]stores. If you have any questions about your diet or nutrition, call the hospital and ask for the dietitian.   Continue to follow regular vegan diet

## 2022-03-08 NOTE — FLOWSHEET NOTE
03/08/22 0745   Handoff   Communication Given Shift Handoff   Handoff Given To Jen Hanna Received From [facility] Communication Face to Face   Time Handoff Given 0758   End of Shift Check Performed Yes

## 2022-03-08 NOTE — PLAN OF CARE
Problem: Falls - Risk of:  Goal: Will remain free from falls  Description: Will remain free from falls  3/8/2022 1214 by Maine Winters RN  Outcome: Completed  3/8/2022 0827 by Maine Winters RN  Outcome: Ongoing  3/8/2022 0455 by Adan Morfin RN  Outcome: Ongoing  Goal: Absence of physical injury  Description: Absence of physical injury  3/8/2022 1214 by Maine Winters RN  Outcome: Completed  3/8/2022 0827 by Maine Winters RN  Outcome: Ongoing  3/8/2022 0455 by Adan Morfin RN  Outcome: Ongoing     Problem: Nutrition  Goal: Optimal nutrition therapy  3/8/2022 1214 by Maine Winters RN  Outcome: Completed  3/8/2022 0942 by Zach Barnes  Outcome: Ongoing  Goal: Understanding of nutritional guidelines  3/8/2022 1214 by Maine Winters RN  Outcome: Completed  3/8/2022 0942 by Zach Barnes  Outcome: Ongoing     Problem: Airway Clearance - Ineffective  Goal: Achieve or maintain patent airway  3/8/2022 1214 by Maine Winters RN  Outcome: Completed  3/8/2022 0827 by Maine Winters RN  Outcome: Ongoing  3/8/2022 0455 by Adan Morfin RN  Outcome: Ongoing     Problem: Gas Exchange - Impaired  Goal: Absence of hypoxia  3/8/2022 1214 by Maine Winters RN  Outcome: Completed  3/8/2022 0827 by Maine Winters RN  Outcome: Ongoing  3/8/2022 0455 by Adan Morfin RN  Outcome: Ongoing  Goal: Promote optimal lung function  3/8/2022 1214 by Maine Winters RN  Outcome: Completed  3/8/2022 0827 by Maine Winters RN  Outcome: Ongoing  3/8/2022 0455 by Adan Morfin RN  Outcome: Ongoing     Problem: Breathing Pattern - Ineffective  Goal: Ability to achieve and maintain a regular respiratory rate  3/8/2022 1214 by Maine Winters RN  Outcome: Completed  3/8/2022 0827 by Maine Winters RN  Outcome: Ongoing  3/8/2022 0455 by Adan Morfin RN  Outcome: Ongoing     Problem:  Body Temperature -  Risk of, Imbalanced  Goal: Ability to maintain a body temperature within defined limits  3/8/2022 1214 by Maine Winters RN  Outcome: Completed  3/8/2022 5870 by Louis Reynolds RN  Outcome: Ongoing  3/8/2022 0455 by Natividad Mustafa RN  Outcome: Ongoing  Goal: Will regain or maintain usual level of consciousness  3/8/2022 1214 by Louis Reynolds RN  Outcome: Completed  3/8/2022 0827 by Louis Reynolds RN  Outcome: Ongoing  3/8/2022 0455 by Natividad Mustafa RN  Outcome: Ongoing  Goal: Complications related to the disease process, condition or treatment will be avoided or minimized  3/8/2022 1214 by Louis Reynolds, RN  Outcome: Completed  3/8/2022 0827 by Louis Reynolds RN  Outcome: Ongoing  3/8/2022 0455 by Natividad Mustafa RN  Outcome: Ongoing     Problem: Isolation Precautions - Risk of Spread of Infection  Goal: Prevent transmission of infection  3/8/2022 1214 by Louis Reynolds RN  Outcome: Completed  3/8/2022 0827 by Louis Reynolds RN  Outcome: Ongoing  3/8/2022 0455 by Natividad Mustafa RN  Outcome: Ongoing     Problem: Nutrition Deficits  Goal: Optimize nutritional status  3/8/2022 1214 by Louis Reynolds RN  Outcome: Completed  3/8/2022 0827 by Louis Reynolds RN  Outcome: Ongoing  3/8/2022 0455 by Natividad Mustafa RN  Outcome: Ongoing     Problem: Risk for Fluid Volume Deficit  Goal: Maintain normal heart rhythm  3/8/2022 1214 by Louis Reynolds RN  Outcome: Completed  3/8/2022 0827 by Louis Reynolds RN  Outcome: Ongoing  3/8/2022 0455 by Natividad Mustafa RN  Outcome: Ongoing  Goal: Maintain absence of muscle cramping  3/8/2022 1214 by Louis Reynolds, RN  Outcome: Completed  3/8/2022 0827 by Louis Reynolds RN  Outcome: Ongoing  3/8/2022 0455 by Natividad Mustafa RN  Outcome: Ongoing  Goal: Maintain normal serum potassium, sodium, calcium, phosphorus, and pH  3/8/2022 1214 by Louis Reynolds, RN  Outcome: Completed  3/8/2022 0827 by Louis Reynolds RN  Outcome: Ongoing  3/8/2022 0455 by Natividad Mustafa RN  Outcome: Ongoing     Problem: Loneliness or Risk for Loneliness  Goal: Demonstrate positive use of time alone when socialization is not possible  3/8/2022 1214 by Louis Reynolds RN  Outcome: Completed  3/8/2022 0827 by Aries Corbett RN  Outcome: Ongoing  3/8/2022 0455 by Samm Stock RN  Outcome: Ongoing     Problem: Fatigue  Goal: Verbalize increase energy and improved vitality  3/8/2022 1214 by Aries Corbett RN  Outcome: Completed  3/8/2022 0827 by Aries Corbett RN  Outcome: Ongoing  3/8/2022 0455 by Samm Stock RN  Outcome: Ongoing     Problem: Patient Education: Go to Patient Education Activity  Goal: Patient/Family Education  3/8/2022 1214 by Aries Corbett RN  Outcome: Completed  3/8/2022 0827 by Aries Corbett RN  Outcome: Ongoing  3/8/2022 0455 by Samm Stock RN  Outcome: Ongoing

## 2022-03-08 NOTE — PROGRESS NOTES
PHARMACY NOTE  Shama Long was ordered [employer]. Per the Formulary Committee, this medication is non-formulary and not stocked by pharmacy. The medication can be reordered at discharge.    LYNDON Gomez.Ph.3/7/48445:15 PM

## 2022-03-08 NOTE — PROGRESS NOTES
Admit to med-surg/tele  COVID positive  Fall, Rib fracture (1 rib fracture)  CT chest obtained    [facility]  3/7/2022

## 2022-03-08 NOTE — PROGRESS NOTES
Comprehensive Nutrition Assessment    Type and Reason for Visit:  Initial (+ screen for vegetarian diet and assessment for underweight status)    Nutrition Recommendations/Plan:   1. Add Ensure Enlive with meals- please document ONS intake % in flowsheet  2. Please obtain an updated weight for this patient- last weight was obtained on 3/7/22  3. Continue ADULT DIET; Regular; Vegetarian (Lacto-Ovo) diet order  4. Monitor appetite/po intake  5. Monitor bowel function, weight trends, and nutrition-related labs    Nutrition Assessment:  pt is nutritionally compromised AEB pt reported poor appetite/po intake ~one week PTA and is COVID-19 (+); pt is at risk for further compromise d/t increased needs r/t COVID-19 infection + underweight BMI = 16.63; will continue ADULT DIET; Regular; Vegetarian (Lacto-Ovo) + will add Ensure Enlive with meals    Malnutrition Assessment:  Malnutrition Status: At risk for malnutrition    Context:  Acute Illness     Findings of the 6 clinical characteristics of malnutrition:  Energy Intake:  1 - 75% or less of estimated energy requirements for 7 or more days  Weight Loss:  Unable to assess (limited weight hx per EMS)     Body Fat Loss:  Unable to assess (covid-19)     Muscle Mass Loss:  Unable to assess (covid-19)    Fluid Accumulation:  No significant fluid accumulation     Strength:  Not Performed    Estimated Daily Nutrient Needs:  Energy (kcal):  6466-4824 kcals/day based on 33-35 kcals/kg/CBW; Weight Used for Energy Requirements:  Current     Protein (g):   g protein/day based on 1.3-1.5 g/kg/IBW;  Weight Used for Protein Requirements:  Ideal        Fluid (ml/day):  3514-1896 ml/day; Method Used for Fluid Requirements:  1 ml/kcal      Nutrition Related Findings:  pt is A & O x 4; pt slipped when getting out of the bathtub on 3/4/22 and landed on his right rib cage; pt was dx with a right rib fx to the 10th rib; pt reports pain when coughing; pt reported that he has not had a good appetite and has not been eating or drinking normally for ~1 week; pt tested positive for COVID-19 on 3/4/22 and remains positive; pt reported that he has had a change in taste + nausea for ~6 days; pt has elevated BG and low Na and Crt      Wounds:  None       Current Nutrition Therapies:    ADULT DIET; Regular; Vegetarian (Lacto-Ovo)    Anthropometric Measures:  · Height: 5' 7\" (170.2 cm)  · Current Body Weight: 106 lb 3.2 oz (48.2 kg) (obtained on 3/7 via bed scale; actual weight)   · Admission Body Weight: 106 lb 3.2 oz (48.2 kg) (obtained on 3/7 via bed scale; actual weight)    · Usual Body Weight: 115 lb (52.2 kg) (obtained on 6/16/21; stated weight; limited weight hx per EMS)     · Ideal Body Weight: 148 lbs; % Ideal Body Weight 71.8 %   · BMI: 16.6  · BMI Categories: Underweight (BMI less than 22) age over 72       Nutrition Diagnosis:   · Predicted inadequate energy intake related to impaired respiratory function,inadequate protein-energy intake,pain,altered taste perception,acute injury/trauma as evidenced by poor intake prior to admission,BMI,nausea,other (comment) (COVID-19 infection)      Nutrition Interventions:   Food and/or Nutrient Delivery:  Continue Current Diet,Start Oral Nutrition Supplement  Nutrition Education/Counseling:  No recommendation at this time   Coordination of Nutrition Care:  Continue to monitor while inpatient    Goals:  pt will consume at least 50% of his meals on ADULT DIET;  Regular; Vegetarian (Lacto-Ovo) diet order x 3 meals/day w/o GI distress; pt will accept/tolerate at least 50% of Ensure Enlive with meals x 3 meals/day       Nutrition Monitoring and Evaluation:   Behavioral-Environmental Outcomes:  None Identified   Food/Nutrient Intake Outcomes:  Food and Nutrient Intake,Supplement Intake  Physical Signs/Symptoms Outcomes:  Biochemical Data,Nausea or Vomiting,Weight,Skin     Discharge Planning:    Continue current diet,Continue Oral Nutrition Supplement Electronically signed by Deon Givens on 3/8/22 at 9:37 AM EST    Contact: [phone]

## 2022-03-08 NOTE — CARE COORDINATION
Case Management Assessment  Initial Evaluation      Patient Name: Ravinder Elizbaeth  YOB: 1940  Diagnosis: Acute respiratory failure with hypoxia ([facility] [address] [J96.01]  Fall due to wet surface, initial encounter [W01. 0XXA]  Closed fracture of one rib of right side, initial encounter [facility]  COVID-19 [U07.1]  History of COVID-19 [Z86.16]  Date / Time: 3/7/2022 11:07 AM    Admission status/Date:3/7/2022  Chart Reviewed: Yes      Patient Interviewed: Yes   Family Interviewed:  No      Hospitalization in the last 30 days:  No      Health Care Decision Maker :   Primary Decision Maker: Lola Romero - Spouse - [phone]    (CM - must 1st enter selection under Navigator - emergency contact- [place] Relationship and pick relationship)   Who do you trust or have selected to make healthcare decisions for you      Met with: pt  Interview conducted  (bedside/phone):    Current PCP: oCmfort Hedrick MD      Financial  Medicare  Precert required for SNF : N          3 night stay required - N    ADLS  Support Systems/Care Needs: Spouse/Significant Other  Transportation: self    Meal Preparation: self    Housing  Living Arrangements: lives in house with spouse  Steps: [address] [facility] for return to present living arrangements: Yes  Identified Issues:     [address] : Yes - [facility] SN/PT/OT Agency:(Services)  Type of Home Care Services: None  Passport/Waiver : No  :                      Phone Number:    Passport/Waiver Services:           Durable Medical Equiptment   DME Provider: none  Equipment: none  Walker___Cane___RTS___ BSC___Shower Chair___Hospital Bed___W/C____Other________  02 at ____Liter(s)---wears(frequency)_______ [facility] ___ CPAP___ BiPap___   N/A____      Home O2 Use :  No    If No for home O2---if presently on O2 during hospitalization:  No      Community Service Affiliation  Dialysis:  No    · Agency:  · Location:  · Dialysis Schedule:  · Phone:   · Fax: Other Community Services:none     DISCHARGE PLAN: Explained Case Management role/services. Chart reviewed. Met with pt and he states he lives in house with spouse and plans to return. Pt is IPTA and drives. Pt supplied with pulse ox and does not qualify for home O2. Pt remains agreeable to [facility] for SN/PT/OT. DC order noted and [facility], called and spoke with Dannie Huertas and she states she does not need new orders as she received them yesterday. Pt to discharge on Eliquis, TC to [facility], pt's preferred pharmacy and cost of Eliquis will be $267.12 due to pt having a deductible. Pt agreeable to have [facility] supply him his Eliquis using the first month free coupon. No further dc needs identified. SpO2 96% on RA. Discharge timeout done with Lopez Arreola. All discharge needs and concerns addressed.

## 2022-03-08 NOTE — CARE COORDINATION
Return call from Dr. Nehemiah Newman office.   They did set this patient up for follow up on Friday 3/11 at 11 am with Dr. Ajay Cardona

## 2022-03-08 NOTE — PLAN OF CARE
Nutrition Problem #1: Predicted inadequate energy intake  Intervention: Food and/or Nutrient Delivery: Continue Current Diet,Start Oral Nutrition Supplement  Nutritional Goals: pt will consume at least 50% of his meals on ADULT DIET;  Regular; Vegetarian (Lacto-Ovo) diet order x 3 meals/day w/o GI distress; pt will accept/tolerate at least 50% of Ensure Enlive with meals x 3 meals/day

## 2022-03-09 ENCOUNTER — CARE COORDINATION (OUTPATIENT)
Dept: CASE MANAGEMENT | Age: 82
End: 2022-03-09

## 2022-03-09 NOTE — CARE COORDINATION
[address] Transitions Initial Follow Up Call    Call within 2 business days of discharge: Yes    Patient: Maribel Boles Patient : 1940   MRN: [id number]  Reason for Admission: COVID-19 Detected on 3/4/2022, chest pain, 10th rib fx 2/2 mechanical fall getting out of bath tub, CT no PE and no PNA, hx COPD, macular degeneration -> home with [facility], pulse ox for home (did not qualify for O2), new Eliquis ($267.12)  Discharge Date: 3/8/22 RARS: Readmission Risk Score: 10.4 ( )    Last Discharge [facility]       Complaint Diagnosis Description Type Department Provider    3/7/22 Rib Injury Fall due to wet surface, initial encounter . .. ED to Hosp-Admission (Discharged) (ADMITTED) [facility] 2 Amber Da Silva MD; Brandy Du ... Was this an external facility discharge? No Discharge Facility:     Care Transition Nurse contacted the patient by telephone to perform post discharge assessment. Provided introduction to self, and explanation of the CTN role, and reason for call due to risk factors for infection and/or exposure to COVID-19. Symptoms reviewed with patient who verbalized the following symptoms: no new symptoms and no worsening symptoms. Due to no new or worsening symptoms encounter was not routed to provider for escalation. Discussed follow-up appointments. If no appointment was previously scheduled, appointment scheduling offered: [facility] follow up appointment(s): No future appointments. Non-[facility] follow up appointment(s): Dr Macrina Nicole 3/11/2022 at [address] ([facility]) provided:  Obtained and reviewed discharge summary and/or continuity of care documents  Education of patient/family/caregiver/guardian to support self-management-s/s monitor;pulse ox instructions  Assessment and support for treatment adherence and medication management-med review completed    Advance Care Planning:   Does patient have an Advance Directive:  decision maker updated.      Discussed exposure protocols and quarantine with CDC Guidelines. Patient was given an opportunity to verbalize any questions and concerns and agrees to contact the CTN or health care provider for questions related to their healthcare. Reviewed and educated patient on any new and changed medications related to discharge diagnosis. Was patient discharged with a pulse oximeter? Yes CTN discussed pulse oximeter instructions and when to notify provider or seek emergency care. 200 - notified [facility] liaison Max Gonsales that patient discharged to home yesterday and of good contact number per Abraham Sapp RN note ([phone]). Noted also that patient has appt on Friday, 3/11 at 11am with Dr Mary Rm. She states the referral was sent at discharge yesterday but was not entered as of this morning so she will follow up. CTN confirmed also with Viola Boyd in intake at [facility] that they have referral and she updated the phone contact for patient at this time. Outreach to Mr Stevo John. He has not checked O2 sats with new pulse ox today. Was 97% RA last night. Reviewed goals and when to seek higher level care/call MD. He voices understanding. He denies cough. He is not SOB in conversation. Was unable to fill Norco. States he doesn't need it. Educated on acetaminophen dosing and instructed him to avoid NSAIDs while on Eliquis. He voices understanding. Instructed him to use pillow or similar item to brace at rib fx site when coughing or changing positions. Reviewed his hospital follow up appointment scheduled by [facility] for Friday, 3/11/2022 at 11:00AM. He is aware. He was aware of [facility] referral and will watch for call from them today. Encouraged mobility as tolerated (with bracing), hydration, nutrition, monitoring SaO2 and attending PCP OV as scheduled. He denies needs otherwise. CTN provided contact information for future needs. Plan for follow-up call in 5-7 days based on severity of symptoms and risk factors.   Plan for next call: symptom management-pain  self management-SaO2  follow up appointment-attended? Follow Up  No future appointments.     Bettina Reilly RN

## 2022-03-10 ENCOUNTER — CARE COORDINATION (OUTPATIENT)
Dept: CARE COORDINATION | Age: 82
End: 2022-03-10

## 2022-03-10 NOTE — CARE COORDINATION
ACM attempted ER outreach. Left message. Contact information for call back provided for questions related to his medications. . Patient active with CTN at this time, as well.

## 2022-03-10 NOTE — CARE COORDINATION
Patient contacted regarding COVID-19 diagnosis and pulse oximeter ordered at discharge. Discussed COVID-19 related testing which was available at this time. Test results were positive. Patient informed of results, if available? Positive on 3/4/22 for covid    Ambulatory Care Manager contacted the patient by telephone to perform follow-up assessment. Verified name and  with patient as identifiers. Patient has following risk factors of: new rib fractures. Symptoms reviewed with patient who verbalized the following symptoms: pain or aching joints, cough and He is having issue with low oxygen saturrations when up ambulating. .   Due to no new or worsening symptoms encounter reported to PCP by home health  routed to provider for escalation. Educated patient about risk for severe COVID-19 due to risk factors according to CDC guidelines. ACM reviewed discharge instructions, medical action plan and red flag symptoms with the patient who verbalized understanding. Discussed COVID vaccination status: No. Education provided on COVID-19 vaccination as appropriate. Discussed exposure protocols and quarantine with CDC Guidelines. Patient was given an opportunity to verbalize any questions and concerns and agrees to contact ACM or health care provider for questions related to their healthcare. Was patient discharged with a pulse oximeter? Yes Discussed and confirmed pulse oximeter discharge instructions and when to notify provider or seek emergency care. Oxygen sat was 95% at home at rest, but dropped below 90% when up and moving. He did return to 92% per the patient with rest. He told me the home health nurse left a message for his provider with the possible need for oxygen. He had only been checking oxygen at rest. Reviewed parameters to return to ER. Normal blood oxygen level is between 93% to 100%. For patients like you with COVID-19, your oxygen level sometimes drops below 93%.    Please use the Pulse-Oximeter at home to check your blood oxygen level twice per day or anytime you have worsening shortness of breath.                -Place the Pulse-Oximeter on your index finger (remove any nail polish if present). -Direct the red light downwards towards your finger, on top of your fingernail.  -Hold your finger still while the machine reads your blood oxygen level.     If you notice that your blood oxygen level stays below 88% while being active OR stays below 90% while sitting or standing, PLEASE RETURN IMMEDIATELY TO THE EMERGENCY DEPARTMENT.  If you normally require home Oxygen (even before you got COVID-19) and you notice that you need more than 4 liters of Oxygen to keep your oxygen level above 88%, PLEASE RETURN IMMEDIATELY TO THE EMERGENCY DEPARTMENT.  If your shortness of breath is severe or getting worse, no matter what your oxygen level states, PLEASE RETURN IMMEDIATELY TO THE EMERGENCY DEPARTMENT.  If you have chest pain, fainting episodes, heart palpitations, or any other serious medical issue, PLEASE RETURN IMMEDIATELY TO THE EMERGENCY DEPARTMENT. ACM provided contact information. Plan for follow-up call in 1-2 days based on severity of symptoms and risk factors.

## 2022-03-11 LAB
BLOOD CULTURE, ROUTINE: NORMAL
CULTURE, BLOOD 2: NORMAL

## 2022-03-14 ENCOUNTER — CARE COORDINATION (OUTPATIENT)
Dept: CASE MANAGEMENT | Age: 82
End: 2022-03-14

## 2022-03-14 NOTE — CARE COORDINATION
[address] Transitions Follow Up Call    3/14/2022    Patient: Michelle Holt  Patient : 1940   MRN: [id number]  Reason for Admission: COVID-19 Detected on 3/4/2022, chest pain, 10th rib fx 2/2 mechanical fall getting out of bath tub, CT no PE and no PNA, hx COPD, macular degeneration -> home with [facility], pulse ox for home (did not qualify for O2), new Eliquis ($267.12)  Discharge Date: 3/8/22 RARS: Readmission Risk Score: 10.4 ( )    Care Transition Nurse contacted the patient by telephone to perform follow-up assessment. Symptoms reviewed with patient who verbalized the following symptoms: no new symptoms and no worsening symptoms. Due to no new or worsening symptoms encounter was not routed to provider for escalation. Patient was given an opportunity to verbalize any questions and concerns and agrees to contact the CTN or health care provider for questions related to their healthcare. Was patient discharged with a pulse oximeter? Yes CTN reviewed pulse oximeter instructions and when to notify provider or seek emergency care. His PCP office canceled his appointment Friday 3/11/2022. [facility] has been visiting. He has new oxygen that was initially set on 3lpm. SaO2 90s. Educated him on pulse oximeter instructions and weaning off oxygen. Good PO intake. Denies pain at site of rib fx. Cough improved. Denies needs at this time. CTN encouraged him to reschedule with PCP office for follow up. Reviewed s/s to report same day to [facility] PCP or CTN. he voices understanding. CTN provided contact information for future reference. Plan for follow-up call in 3-5 days based on severity of symptoms and risk factors. Plan for next call: symptom management-COVID      Follow Up  No future appointments.     Bettina Reilly RN

## 2022-03-17 ENCOUNTER — CARE COORDINATION (OUTPATIENT)
Dept: CASE MANAGEMENT | Age: 82
End: 2022-03-17

## 2022-03-17 NOTE — CARE COORDINATION
[facility] Transitions Follow Up Call    3/17/2022    Patient: Isrrael Diamond  Patient : 1940   MRN: [id number]  Reason for Admission: COVID-19 Detected on 3/4/2022, chest pain, 10th rib fx 2/2 mechanical fall getting out of bath tub, CT no PE and no PNA, hx COPD, macular degeneration -> home with [facility], pulse ox for home (did not qualify for O2), new Eliquis ($267.12)  Discharge Date: 3/8/22 RARS: Readmission Risk Score: 10.4 ( )       Care Transition Nurse contacted the patient by telephone to perform follow-up assessment. Symptoms reviewed with patient who verbalized the following symptoms: no new symptoms and no worsening symptoms. Due to no new or worsening symptoms encounter was not routed to provider for escalation. Patient was given an opportunity to verbalize any questions and concerns and agrees to contact the CTN or health care provider for questions related to their healthcare. Was patient discharged with a pulse oximeter? Yes CTN reviewed pulse oximeter instructions and when to notify provider or seek emergency care. States he weaned of oxygen with sats maintaining in 90s. His breathing is at his baseline. He was instructed to contact his PCP office for hospital follow up appointment and ask about CXR f/up. He voices understanding. CTN provided contact information for future reference. No further follow-up call identified based on severity of symptoms and risk factors      Follow Up  No future appointments.     Carmita Chris RN

## 2022-08-24 ENCOUNTER — TELEPHONE (OUTPATIENT)
Dept: CASE MANAGEMENT | Age: 82
End: 2022-08-24

## 2022-08-24 NOTE — TELEPHONE ENCOUNTER
dave report CT CHEST PULM 3/7/2022 with F/U imaging recommendations routed to PCP Lemuel Donahue MD with [facility]. It looks like there was an order for a F/U CT but pt did not have it done here.     Thank you,  Brent Jennings RN  [facility] Lung Navigator  [phone]

## 2023-09-14 NOTE — PROGRESS NOTES
Daphney Moo    Age 82 y.o.    male    1940    MRN [id number]    10/2/2023  Arrival Time_____________  OR Time____________90 Min     Procedure(s):  CYSTOSCOPY,  TRANSURETHRAL WATER JET  ABLATION OF THE PROSTATE    FLOWNIX                      General   Surgeon(s):  Dejan Kebede, MD      DAY ADMIT ___  SDS/OP ___  OUTPT IN BED ___        Phone [phone] (home) [phone] (work)    PCP _____________________ Phone_________________ [facility] ( ) Epic CE ( ) Appt ________    ADDITIONAL INFO __________________________________ Cardio/Consult _____________    NOTES _____________________________________________________________________    ____________________________________________________________________________    PAT APPT DATE:________ TIME: ________  FAXED QAD: _______  (__) H&P w/ Hospitalist  __________________________________________________________________________  Preop Nurse phone screen complete: _____________  (__) CBC     (__) W/ DIFF ___________     (__) Hgb A1C    ___________  (__) CHEST X RAY   __________  (__) LIPID PROFILE  ___________  (__) EKG   __________  (__) PT-INR / APTT  ___________  (__) PFT's   __________  (__) BMP   ___________  (__) CAROTIDS  __________  (__) CMP   ___________  (__) VEIN MAPPING  __________  (__) U/A   ___________  (__) HISTORY & PHYSICAL __________  (__) URINE C & S  ___________  (__) CARDIAC CLEARANCE __________  (__) U/A W/ FLEX  ___________  (__) PULM.  CLEARANCE __________  (__) SERUM PREGNANCY ___________  (__) Check Epic DOS orders __________  (__) TYPE & SCREEN __________repeat ( ) (__)  __________________ __________  (__) Albumin / Prealbumin ___________  (__)  __________________ __________  (__) TRANSFERRIN  ___________  (__)  __________________ __________  (__) LIVER PROFILE  ___________  (__)  __________________ __________  (__) MRSA NASAL SWAB ___________  (__) URINE PREG DOS __________  (__) SED RATE  ___________  (__) BLOOD SUGAR DOS __________  (__)

## 2023-09-25 NOTE — PROGRESS NOTES
Surgery Date and Time: 10/2/23 @ 11:30 AM     Arrival Time:  09:30 AM    The instructions given when and if a patient needs to stop oral intake prior to surgery varies. Follow the instructions you were given by your surgeon or RN during   preop call. _X_Nothing to eat or to drink after Midnight the night before the surgery. NO gum, mints, candy or ice chips day of surgery. Aspirin, Ibuprofen, Advil, Naproxen, Vitamin E and other Anti-inflammatory products and supplements should be stopped for 5 -7days before surgery or as  directed     by your physician. Do not smoke or vape, and do not drink any alcoholic beverages 24 hours prior to surgery, this includes NA Beer. Refrain from using any recreational drugs,    including non-prescribed prescription drugs. You may brush your teeth and gargle the morning of surgery. DO NOT SWALLOW WATER. You MUST plan for a responsible adult to stay on site while you are here and take you home after your surgery. You will not be allowed to leave alone or drive               yourself home. It is requested someone stay with you the first 24 hrs. Your surgery will be cancelled if you do not have a ride home with a responsible adult. Please wear simple, loose-fitting clothing to the hospital. Sonam Abide not bring valuables (money, credit cards, checkbooks, etc.) Do not wear any makeup (including NO eye               makeup) and no nail polish if applicable. DO NOT wear any jewelry or body piercings day of surgery. All body piercing jewelry must be removed. If you have dentures they will be removed before going to the OR; we will provide a container. If you wear contact lenses or glasses, they will be removed; please               bring a case for them or wear glasses day of surgery.                 Please see your family doctor/pediatrician for a Preop History & Physical and/or concerning medications within 30 days of the surgery date. [facility]               physicians can fax H&P/test results to [phone]. You may need cardiac clearance if you see a cardiologist, check with PCP or surgeon. If you have a Living Will and Durable Power of  for Healthcare, please bring in a copy to be scanned at registration. Notify your Surgeon if you develop any illness between now and the surgery date, cough, cold, fever, sore throat, nausea, vomiting, etc.  Please notify your surgeon              if you experience dizziness, shortness of breath or blurred vision between now & the time of your surgery. DO NOT shave your operative site less than 4 days prior to surgery. For face & neck surgery, men may use an electric razor up to 2 days prior to surgery. To help prevent infection, change your sheets the night before surgery. Also, shower the night before & morning of surgery using an antibacterial soap (Dial    or Safeguard). Do not apply lotion after shower or day of surgery. To provide excellent care visitors will be limited to two per room at any given time. Please bring your picture ID and insurance card for registration prior to arriving to second floor surgery department. If you use a CPAP/BiPAP please bring with you on the day of the surgery. If you use oxygen, please bring portable tank with you. For your convenience [facility] has an outpatient pharmacy on site to fill your prescriptions prior to 5 pm.                Bring a complete list of all your medications with name and dose including any supplements. Visitor policy: May have 2-3 visitors in Surgery Waiting Room. Visiting hours are 8a-8p. Overnight visitors will be at the discretion of the unit nurse. No visitors under    the age of 15 permitted.      *Please call [facility] Preadmission Testing Department for any further questions  [phone] [address]          Email sent:  9/25/23

## 2023-10-02 ENCOUNTER — HOSPITAL ENCOUNTER (OUTPATIENT)
Age: 83
Setting detail: OBSERVATION
Discharge: HOME | End: 2023-10-03
Attending: UROLOGY | Admitting: UROLOGY
Payer: MEDICARE

## 2023-10-02 ENCOUNTER — ANESTHESIA (OUTPATIENT)
Dept: OPERATING ROOM | Age: 83
End: 2023-10-02
Payer: MEDICARE

## 2023-10-02 ENCOUNTER — ANESTHESIA EVENT (OUTPATIENT)
Dept: OPERATING ROOM | Age: 83
End: 2023-10-02
Payer: MEDICARE

## 2023-10-02 DIAGNOSIS — N13.8 ENLARGED PROSTATE WITH URINARY OBSTRUCTION: Primary | ICD-10-CM

## 2023-10-02 DIAGNOSIS — N40.1 BPH WITH URINARY OBSTRUCTION: ICD-10-CM

## 2023-10-02 DIAGNOSIS — N40.1 ENLARGED PROSTATE WITH URINARY OBSTRUCTION: Primary | ICD-10-CM

## 2023-10-02 DIAGNOSIS — C67.4 MALIGNANT NEOPLASM OF POSTERIOR WALL OF BLADDER (HCC): ICD-10-CM

## 2023-10-02 DIAGNOSIS — N13.8 BPH WITH URINARY OBSTRUCTION: ICD-10-CM

## 2023-10-02 LAB
ABO + RH BLD: NORMAL
ANION GAP SERPL CALCULATED.3IONS-SCNC: 8 MMOL/L (ref 3–16)
APTT BLD: 27.1 SEC (ref 22.7–35.9)
BLD GP AB SCN SERPL QL: NORMAL
BUN SERPL-MCNC: 8 MG/DL (ref 7–20)
CALCIUM SERPL-MCNC: 9.3 MG/DL (ref 8.3–10.6)
CHLORIDE SERPL-SCNC: 96 MMOL/L (ref 99–110)
CO2 SERPL-SCNC: 30 MMOL/L (ref 21–32)
CREAT SERPL-MCNC: 0.7 MG/DL (ref 0.8–1.3)
DEPRECATED RDW RBC AUTO: 13.6 % (ref 12.4–15.4)
GFR SERPLBLD CREATININE-BSD FMLA CKD-EPI: >60 ML/MIN/{1.73_M2}
GLUCOSE SERPL-MCNC: 95 MG/DL (ref 70–99)
HCT VFR BLD AUTO: 40.9 % (ref 40.5–52.5)
HGB BLD-MCNC: 14 G/DL (ref 13.5–17.5)
MCH RBC QN AUTO: 31.4 PG (ref 26–34)
MCHC RBC AUTO-ENTMCNC: 34.2 G/DL (ref 31–36)
MCV RBC AUTO: 91.8 FL (ref 80–100)
PLATELET # BLD AUTO: 172 K/UL (ref 135–450)
PMV BLD AUTO: 7.8 FL (ref 5–10.5)
POTASSIUM SERPL-SCNC: 4.2 MMOL/L (ref 3.5–5.1)
RBC # BLD AUTO: 4.45 M/UL (ref 4.2–5.9)
SODIUM SERPL-SCNC: 134 MMOL/L (ref 136–145)
WBC # BLD AUTO: 4.1 K/UL (ref 4–11)

## 2023-10-02 PROCEDURE — 6360000002 HC RX W HCPCS

## 2023-10-02 PROCEDURE — 2500000003 HC RX 250 WO HCPCS

## 2023-10-02 PROCEDURE — 2720000010 HC SURG SUPPLY STERILE: Performed by: UROLOGY

## 2023-10-02 PROCEDURE — 86900 BLOOD TYPING SEROLOGIC ABO: CPT

## 2023-10-02 PROCEDURE — 2580000003 HC RX 258: Performed by: UROLOGY

## 2023-10-02 PROCEDURE — 6360000002 HC RX W HCPCS: Performed by: ANESTHESIOLOGY

## 2023-10-02 PROCEDURE — 86850 RBC ANTIBODY SCREEN: CPT

## 2023-10-02 PROCEDURE — A4217 STERILE WATER/SALINE, 500 ML: HCPCS | Performed by: UROLOGY

## 2023-10-02 PROCEDURE — 3600000014 HC SURGERY LEVEL 4 ADDTL 15MIN: Performed by: UROLOGY

## 2023-10-02 PROCEDURE — 85730 THROMBOPLASTIN TIME PARTIAL: CPT

## 2023-10-02 PROCEDURE — 3700000001 HC ADD 15 MINUTES (ANESTHESIA): Performed by: UROLOGY

## 2023-10-02 PROCEDURE — 3600000004 HC SURGERY LEVEL 4 BASE: Performed by: UROLOGY

## 2023-10-02 PROCEDURE — 7100000001 HC PACU RECOVERY - ADDTL 15 MIN: Performed by: UROLOGY

## 2023-10-02 PROCEDURE — G0378 HOSPITAL OBSERVATION PER HR: HCPCS

## 2023-10-02 PROCEDURE — 86901 BLOOD TYPING SEROLOGIC RH(D): CPT

## 2023-10-02 PROCEDURE — C2596 PROBE, ROBOTIC, WATER-JET: HCPCS | Performed by: UROLOGY

## 2023-10-02 PROCEDURE — 88305 TISSUE EXAM BY PATHOLOGIST: CPT

## 2023-10-02 PROCEDURE — 80048 BASIC METABOLIC PNL TOTAL CA: CPT

## 2023-10-02 PROCEDURE — 3700000000 HC ANESTHESIA ATTENDED CARE: Performed by: UROLOGY

## 2023-10-02 PROCEDURE — 7100000000 HC PACU RECOVERY - FIRST 15 MIN: Performed by: UROLOGY

## 2023-10-02 PROCEDURE — 2709999900 HC NON-CHARGEABLE SUPPLY: Performed by: UROLOGY

## 2023-10-02 PROCEDURE — 85027 COMPLETE CBC AUTOMATED: CPT

## 2023-10-02 RX ORDER — SODIUM CHLORIDE 9 MG/ML
INJECTION, SOLUTION INTRAVENOUS PRN
Status: DISCONTINUED | OUTPATIENT
Start: 2023-10-02 | End: 2023-10-02 | Stop reason: HOSPADM

## 2023-10-02 RX ORDER — KETAMINE HCL IN NACL, ISO-OSM 100MG/10ML
SYRINGE (ML) INJECTION PRN
Status: DISCONTINUED | OUTPATIENT
Start: 2023-10-02 | End: 2023-10-02 | Stop reason: SDUPTHER

## 2023-10-02 RX ORDER — DEXAMETHASONE SODIUM PHOSPHATE 4 MG/ML
INJECTION, SOLUTION INTRA-ARTICULAR; INTRALESIONAL; INTRAMUSCULAR; INTRAVENOUS; SOFT TISSUE PRN
Status: DISCONTINUED | OUTPATIENT
Start: 2023-10-02 | End: 2023-10-02 | Stop reason: SDUPTHER

## 2023-10-02 RX ORDER — MAGNESIUM SULFATE HEPTAHYDRATE 500 MG/ML
INJECTION, SOLUTION INTRAMUSCULAR; INTRAVENOUS PRN
Status: DISCONTINUED | OUTPATIENT
Start: 2023-10-02 | End: 2023-10-02 | Stop reason: SDUPTHER

## 2023-10-02 RX ORDER — OXYCODONE HYDROCHLORIDE 5 MG/1
5 TABLET ORAL PRN
Status: DISCONTINUED | OUTPATIENT
Start: 2023-10-02 | End: 2023-10-02 | Stop reason: HOSPADM

## 2023-10-02 RX ORDER — SODIUM CHLORIDE 9 MG/ML
INJECTION, SOLUTION INTRAVENOUS CONTINUOUS
Status: DISCONTINUED | OUTPATIENT
Start: 2023-10-02 | End: 2023-10-03

## 2023-10-02 RX ORDER — SODIUM CHLORIDE 0.9 % (FLUSH) 0.9 %
5-40 SYRINGE (ML) INJECTION PRN
Status: DISCONTINUED | OUTPATIENT
Start: 2023-10-02 | End: 2023-10-02 | Stop reason: HOSPADM

## 2023-10-02 RX ORDER — POLYETHYLENE GLYCOL 3350 17 G/17G
17 POWDER, FOR SOLUTION ORAL DAILY PRN
Status: DISCONTINUED | OUTPATIENT
Start: 2023-10-02 | End: 2023-10-03 | Stop reason: HOSPADM

## 2023-10-02 RX ORDER — SODIUM CHLORIDE 0.9 % (FLUSH) 0.9 %
5-40 SYRINGE (ML) INJECTION EVERY 12 HOURS SCHEDULED
Status: DISCONTINUED | OUTPATIENT
Start: 2023-10-02 | End: 2023-10-02 | Stop reason: HOSPADM

## 2023-10-02 RX ORDER — SODIUM CHLORIDE 0.9 % (FLUSH) 0.9 %
5-40 SYRINGE (ML) INJECTION PRN
Status: DISCONTINUED | OUTPATIENT
Start: 2023-10-02 | End: 2023-10-03 | Stop reason: HOSPADM

## 2023-10-02 RX ORDER — OXYCODONE HYDROCHLORIDE 5 MG/1
10 TABLET ORAL EVERY 4 HOURS PRN
Status: DISCONTINUED | OUTPATIENT
Start: 2023-10-02 | End: 2023-10-03 | Stop reason: HOSPADM

## 2023-10-02 RX ORDER — ROCURONIUM BROMIDE 10 MG/ML
INJECTION, SOLUTION INTRAVENOUS PRN
Status: DISCONTINUED | OUTPATIENT
Start: 2023-10-02 | End: 2023-10-02 | Stop reason: SDUPTHER

## 2023-10-02 RX ORDER — EPHEDRINE SULFATE 50 MG/ML
INJECTION INTRAVENOUS PRN
Status: DISCONTINUED | OUTPATIENT
Start: 2023-10-02 | End: 2023-10-02 | Stop reason: SDUPTHER

## 2023-10-02 RX ORDER — OXYCODONE HYDROCHLORIDE 5 MG/1
10 TABLET ORAL PRN
Status: DISCONTINUED | OUTPATIENT
Start: 2023-10-02 | End: 2023-10-02 | Stop reason: HOSPADM

## 2023-10-02 RX ORDER — ONDANSETRON 4 MG/1
4 TABLET, ORALLY DISINTEGRATING ORAL EVERY 8 HOURS PRN
Status: DISCONTINUED | OUTPATIENT
Start: 2023-10-02 | End: 2023-10-03 | Stop reason: HOSPADM

## 2023-10-02 RX ORDER — PHENYLEPHRINE HCL IN 0.9% NACL 1 MG/10 ML
SYRINGE (ML) INTRAVENOUS PRN
Status: DISCONTINUED | OUTPATIENT
Start: 2023-10-02 | End: 2023-10-02 | Stop reason: SDUPTHER

## 2023-10-02 RX ORDER — LIDOCAINE HYDROCHLORIDE 20 MG/ML
INJECTION, SOLUTION INFILTRATION; PERINEURAL PRN
Status: DISCONTINUED | OUTPATIENT
Start: 2023-10-02 | End: 2023-10-02 | Stop reason: SDUPTHER

## 2023-10-02 RX ORDER — ONDANSETRON 2 MG/ML
INJECTION INTRAMUSCULAR; INTRAVENOUS PRN
Status: DISCONTINUED | OUTPATIENT
Start: 2023-10-02 | End: 2023-10-02 | Stop reason: SDUPTHER

## 2023-10-02 RX ORDER — OXYCODONE HYDROCHLORIDE 5 MG/1
5 TABLET ORAL EVERY 4 HOURS PRN
Status: DISCONTINUED | OUTPATIENT
Start: 2023-10-02 | End: 2023-10-03 | Stop reason: HOSPADM

## 2023-10-02 RX ORDER — DIPHENHYDRAMINE HYDROCHLORIDE 50 MG/ML
12.5 INJECTION INTRAMUSCULAR; INTRAVENOUS
Status: DISCONTINUED | OUTPATIENT
Start: 2023-10-02 | End: 2023-10-02 | Stop reason: HOSPADM

## 2023-10-02 RX ORDER — PROPOFOL 10 MG/ML
INJECTION, EMULSION INTRAVENOUS PRN
Status: DISCONTINUED | OUTPATIENT
Start: 2023-10-02 | End: 2023-10-02 | Stop reason: SDUPTHER

## 2023-10-02 RX ORDER — SODIUM CHLORIDE 9 MG/ML
INJECTION, SOLUTION INTRAVENOUS PRN
Status: DISCONTINUED | OUTPATIENT
Start: 2023-10-02 | End: 2023-10-03 | Stop reason: HOSPADM

## 2023-10-02 RX ORDER — ONDANSETRON 2 MG/ML
4 INJECTION INTRAMUSCULAR; INTRAVENOUS
Status: DISCONTINUED | OUTPATIENT
Start: 2023-10-02 | End: 2023-10-02 | Stop reason: HOSPADM

## 2023-10-02 RX ORDER — MAGNESIUM HYDROXIDE 1200 MG/15ML
LIQUID ORAL CONTINUOUS PRN
Status: COMPLETED | OUTPATIENT
Start: 2023-10-02 | End: 2023-10-02

## 2023-10-02 RX ORDER — SODIUM CHLORIDE 0.9 % (FLUSH) 0.9 %
5-40 SYRINGE (ML) INJECTION EVERY 12 HOURS SCHEDULED
Status: DISCONTINUED | OUTPATIENT
Start: 2023-10-02 | End: 2023-10-03 | Stop reason: HOSPADM

## 2023-10-02 RX ORDER — LEVOFLOXACIN 5 MG/ML
500 INJECTION, SOLUTION INTRAVENOUS
Status: DISCONTINUED | OUTPATIENT
Start: 2023-10-02 | End: 2023-10-02 | Stop reason: HOSPADM

## 2023-10-02 RX ORDER — HYDROMORPHONE HYDROCHLORIDE 1 MG/ML
0.25 INJECTION, SOLUTION INTRAMUSCULAR; INTRAVENOUS; SUBCUTANEOUS
Status: DISCONTINUED | OUTPATIENT
Start: 2023-10-02 | End: 2023-10-03 | Stop reason: HOSPADM

## 2023-10-02 RX ORDER — MAGNESIUM HYDROXIDE 1200 MG/15ML
LIQUID ORAL PRN
Status: DISCONTINUED | OUTPATIENT
Start: 2023-10-02 | End: 2023-10-02 | Stop reason: ALTCHOICE

## 2023-10-02 RX ORDER — LABETALOL HYDROCHLORIDE 5 MG/ML
5 INJECTION, SOLUTION INTRAVENOUS EVERY 10 MIN PRN
Status: DISCONTINUED | OUTPATIENT
Start: 2023-10-02 | End: 2023-10-02 | Stop reason: HOSPADM

## 2023-10-02 RX ORDER — ONDANSETRON 2 MG/ML
4 INJECTION INTRAMUSCULAR; INTRAVENOUS EVERY 6 HOURS PRN
Status: DISCONTINUED | OUTPATIENT
Start: 2023-10-02 | End: 2023-10-03 | Stop reason: HOSPADM

## 2023-10-02 RX ORDER — HYDROMORPHONE HYDROCHLORIDE 1 MG/ML
0.5 INJECTION, SOLUTION INTRAMUSCULAR; INTRAVENOUS; SUBCUTANEOUS
Status: DISCONTINUED | OUTPATIENT
Start: 2023-10-02 | End: 2023-10-03 | Stop reason: HOSPADM

## 2023-10-02 RX ADMIN — Medication 50 MCG: at 12:10

## 2023-10-02 RX ADMIN — LIDOCAINE HYDROCHLORIDE 100 MG: 20 INJECTION, SOLUTION INFILTRATION; PERINEURAL at 12:04

## 2023-10-02 RX ADMIN — Medication 100 MCG: at 12:22

## 2023-10-02 RX ADMIN — SUGAMMADEX 200 MG: 100 INJECTION, SOLUTION INTRAVENOUS at 13:17

## 2023-10-02 RX ADMIN — HYDROMORPHONE HYDROCHLORIDE 0.25 MG: 1 INJECTION, SOLUTION INTRAMUSCULAR; INTRAVENOUS; SUBCUTANEOUS at 14:00

## 2023-10-02 RX ADMIN — ROCURONIUM BROMIDE 50 MG: 50 INJECTION, SOLUTION INTRAVENOUS at 12:04

## 2023-10-02 RX ADMIN — MAGNESIUM SULFATE HEPTAHYDRATE 1 G: 500 INJECTION, SOLUTION INTRAMUSCULAR; INTRAVENOUS at 12:15

## 2023-10-02 RX ADMIN — SODIUM CHLORIDE: 9 INJECTION, SOLUTION INTRAVENOUS at 17:34

## 2023-10-02 RX ADMIN — EPHEDRINE SULFATE 10 MG: 50 INJECTION INTRAVENOUS at 13:09

## 2023-10-02 RX ADMIN — Medication 10 MG: at 12:10

## 2023-10-02 RX ADMIN — Medication 10 MG: at 12:22

## 2023-10-02 RX ADMIN — DEXAMETHASONE SODIUM PHOSPHATE 8 MG: 4 INJECTION, SOLUTION INTRAMUSCULAR; INTRAVENOUS at 12:10

## 2023-10-02 RX ADMIN — PROPOFOL 150 MG: 10 INJECTION, EMULSION INTRAVENOUS at 12:04

## 2023-10-02 RX ADMIN — ONDANSETRON 4 MG: 2 INJECTION INTRAMUSCULAR; INTRAVENOUS at 12:10

## 2023-10-02 RX ADMIN — Medication 10 ML: at 20:00

## 2023-10-02 RX ADMIN — Medication 50 MCG: at 12:15

## 2023-10-02 ASSESSMENT — PAIN SCALES - GENERAL
PAINLEVEL_OUTOF10: 2
PAINLEVEL_OUTOF10: 0
PAINLEVEL_OUTOF10: 4
PAINLEVEL_OUTOF10: 0

## 2023-10-02 ASSESSMENT — PAIN DESCRIPTION - LOCATION
LOCATION: PENIS
LOCATION: PENIS

## 2023-10-02 NOTE — PROGRESS NOTES
Patient arrived to A2 room 219 from PACU with all belongings and family at bedside. VSS. CBI instilling and draining appropriately. Bed in locked, lowest position, with alarm on. Call light within reach.

## 2023-10-02 NOTE — BRIEF OP NOTE
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 ([address]) [C67.4]     * BPH with urinary obstruction [N40.1, N13.8]    Post-Op Diagnosis: Same       Procedure(s):  CYSTOSCOPY,  TRANSURETHRAL WATER JET  ABLATION OF THE PROSTATE    FLOWNIX    Surgeon(s):  Britton Singh MD    Assistant:  * No surgical staff found *    Anesthesia: General    Estimated Blood Loss (mL): Minimal    Complications: None    Specimens:   ID Type Source Tests Collected by Time Destination   A : PROSTATE CHIPS Tissue Prostate SURGICAL PATHOLOGY Britton Singh MD 10/2/2023 1307        Implants:  * No implants in log *      Drains:   Urinary Catheter 10/02/23 (Active)       Findings: Significant prostate enlargement with large median lobe      Electronically signed by Britton Singh MD on 10/2/2023 at 1:36 PM

## 2023-10-02 NOTE — PROGRESS NOTES
Preprocedure completed, see flow sheet. IV placed, lab work collected, consents signed, belonging collected and labeled.

## 2023-10-03 VITALS
HEART RATE: 61 BPM | HEIGHT: 67 IN | SYSTOLIC BLOOD PRESSURE: 181 MMHG | BODY MASS INDEX: 15.73 KG/M2 | DIASTOLIC BLOOD PRESSURE: 63 MMHG | WEIGHT: 100.2 LBS | OXYGEN SATURATION: 100 % | RESPIRATION RATE: 18 BRPM | TEMPERATURE: 97.5 F

## 2023-10-03 LAB
ALBUMIN SERPL-MCNC: 3.7 G/DL (ref 3.4–5)
ALBUMIN/GLOB SERPL: 1.5 {RATIO} (ref 1.1–2.2)
ALP SERPL-CCNC: 64 U/L (ref 40–129)
ALT SERPL-CCNC: 11 U/L (ref 10–40)
ANION GAP SERPL CALCULATED.3IONS-SCNC: 9 MMOL/L (ref 3–16)
AST SERPL-CCNC: 21 U/L (ref 15–37)
BASOPHILS # BLD: 0.1 K/UL (ref 0–0.2)
BASOPHILS NFR BLD: 1.2 %
BILIRUB SERPL-MCNC: 0.5 MG/DL (ref 0–1)
BUN SERPL-MCNC: 11 MG/DL (ref 7–20)
CALCIUM SERPL-MCNC: 9 MG/DL (ref 8.3–10.6)
CHLORIDE SERPL-SCNC: 102 MMOL/L (ref 99–110)
CO2 SERPL-SCNC: 25 MMOL/L (ref 21–32)
CREAT SERPL-MCNC: 0.7 MG/DL (ref 0.8–1.3)
DEPRECATED RDW RBC AUTO: 13.9 % (ref 12.4–15.4)
EOSINOPHIL # BLD: 0 K/UL (ref 0–0.6)
EOSINOPHIL NFR BLD: 0.1 %
GFR SERPLBLD CREATININE-BSD FMLA CKD-EPI: >60 ML/MIN/{1.73_M2}
GLUCOSE SERPL-MCNC: 92 MG/DL (ref 70–99)
HCT VFR BLD AUTO: 37.3 % (ref 40.5–52.5)
HGB BLD-MCNC: 12.7 G/DL (ref 13.5–17.5)
LYMPHOCYTES # BLD: 0.9 K/UL (ref 1–5.1)
LYMPHOCYTES NFR BLD: 12.6 %
MCH RBC QN AUTO: 31.5 PG (ref 26–34)
MCHC RBC AUTO-ENTMCNC: 34.1 G/DL (ref 31–36)
MCV RBC AUTO: 92.3 FL (ref 80–100)
MONOCYTES # BLD: 0.6 K/UL (ref 0–1.3)
MONOCYTES NFR BLD: 8.2 %
NEUTROPHILS # BLD: 5.7 K/UL (ref 1.7–7.7)
NEUTROPHILS NFR BLD: 77.9 %
PLATELET # BLD AUTO: 150 K/UL (ref 135–450)
PMV BLD AUTO: 8.3 FL (ref 5–10.5)
POTASSIUM SERPL-SCNC: 4.3 MMOL/L (ref 3.5–5.1)
PROT SERPL-MCNC: 6.1 G/DL (ref 6.4–8.2)
RBC # BLD AUTO: 4.04 M/UL (ref 4.2–5.9)
SODIUM SERPL-SCNC: 136 MMOL/L (ref 136–145)
WBC # BLD AUTO: 7.3 K/UL (ref 4–11)

## 2023-10-03 PROCEDURE — 2580000003 HC RX 258: Performed by: UROLOGY

## 2023-10-03 PROCEDURE — G0378 HOSPITAL OBSERVATION PER HR: HCPCS

## 2023-10-03 PROCEDURE — 51798 US URINE CAPACITY MEASURE: CPT

## 2023-10-03 PROCEDURE — 6370000000 HC RX 637 (ALT 250 FOR IP): Performed by: INTERNAL MEDICINE

## 2023-10-03 PROCEDURE — 85025 COMPLETE CBC W/AUTO DIFF WBC: CPT

## 2023-10-03 PROCEDURE — 36415 COLL VENOUS BLD VENIPUNCTURE: CPT

## 2023-10-03 PROCEDURE — 80053 COMPREHEN METABOLIC PANEL: CPT

## 2023-10-03 RX ORDER — AMLODIPINE BESYLATE 5 MG/1
5 TABLET ORAL DAILY
Qty: 30 TABLET | Refills: 0 | Status: SHIPPED | OUTPATIENT
Start: 2023-10-03

## 2023-10-03 RX ORDER — AMLODIPINE BESYLATE 5 MG/1
5 TABLET ORAL DAILY
Status: DISCONTINUED | OUTPATIENT
Start: 2023-10-03 | End: 2023-10-03 | Stop reason: HOSPADM

## 2023-10-03 RX ORDER — SENNA AND DOCUSATE SODIUM 50; 8.6 MG/1; MG/1
1 TABLET, FILM COATED ORAL 2 TIMES DAILY
Qty: 20 TABLET | Refills: 0 | Status: SHIPPED | OUTPATIENT
Start: 2023-10-03 | End: 2023-10-13

## 2023-10-03 RX ORDER — HYDROCODONE BITARTRATE AND ACETAMINOPHEN 5; 325 MG/1; MG/1
1 TABLET ORAL EVERY 8 HOURS PRN
Qty: 15 TABLET | Refills: 0 | Status: SHIPPED | OUTPATIENT
Start: 2023-10-03 | End: 2023-10-08

## 2023-10-03 RX ORDER — CEPHALEXIN 500 MG/1
500 CAPSULE ORAL 2 TIMES DAILY
Qty: 10 CAPSULE | Refills: 0 | Status: SHIPPED | OUTPATIENT
Start: 2023-10-03 | End: 2023-10-08

## 2023-10-03 RX ADMIN — AMLODIPINE BESYLATE 5 MG: 5 TABLET ORAL at 16:27

## 2023-10-03 RX ADMIN — Medication 10 ML: at 08:19

## 2023-10-03 RX ADMIN — SODIUM CHLORIDE: 9 INJECTION, SOLUTION INTRAVENOUS at 08:19

## 2023-10-03 ASSESSMENT — PAIN SCALES - GENERAL
PAINLEVEL_OUTOF10: 0

## 2023-10-03 NOTE — PROGRESS NOTES
Patient ok for discharge per MD. IV removed. Discharge instructions given and reviewed. Patient instructed to see PCP or Dr. Moe Pike in one week to assess BP. Pt educated on importance of taking BP medications upon discharge and monitoring BP daily. No questions or concerns at this time. Pt ambulated to family car with wife and all belongings.

## 2023-10-03 NOTE — PROGRESS NOTES
Spoke with Reyna Courtney with urology. PA in agreement with checking BP, giving amlodipine, and rechecking BP prior to discharge. Will call PA with BP recheck.

## 2023-10-03 NOTE — PROGRESS NOTES
Per Dr. Rosenda Pearson, pt ok to discharge this evening after receiving amlodipine dose and BP checks.

## 2023-10-03 NOTE — DISCHARGE INSTRUCTIONS
You are being discharged after a : transurethral water jet ablation of the prostate      Diet:  - Eat what you can tolerate, appetite after surgery can take some time to reture    Pain control:  - Take prescribed pain medications as needed, if pain is minimal you can take tylenol or ibuprofen. If taking narcotics you should take with stool softener to avoid constipation.      Activity:  -No driving for 2 weeks or while on narcotic pain meds  -Ok to wash in shower 24 hours after your procedure, don't scrub incision, no baths for 2 weeks  - Avoid any strenuous activity, exercise or lifting > 20 lbs for 4 weeks    Call your physician if you develop:  -  Fevers > 100.4 that last for more than 12 hours  -  Pain uncontrolled by oral medications  -  not tolerating oral liquids or vomiting  -  Incision developing redness/ swelling/ drainage/ or bleeding  -  chest pain, difficulty breathing, leg swelling or tenderness    See pcp in 1 week to recheck your blood pressure     Vilma Urbano PA-C 10/3/88980:10 PM

## 2023-10-03 NOTE — PROGRESS NOTES
Perfect Serve sent to Amando Blanton MD to request presence back at bedside. Patient requesting further explanation. Awaiting response.

## 2023-10-03 NOTE — PROGRESS NOTES
Pts BP in the 180s-190s RN placed call to [facility] to inform MD and PA. Information given to answering service, awaiting call back.

## 2023-10-03 NOTE — PROGRESS NOTES
RN received call from [place], [place]. New BP obtained and placed in chart for PA to review. PA to place discharge order once BP is reviewed.

## 2023-10-03 NOTE — PLAN OF CARE
Problem: Discharge Planning  Goal: Discharge to home or other facility with appropriate resources  Outcome: Progressing     Problem: Pain  Goal: Verbalizes/displays adequate comfort level or baseline comfort level  Outcome: Progressing     Problem: Safety - Adult  Goal: Free from fall injury  Outcome: Progressing     Problem: ABCDS Injury Assessment  Goal: Absence of physical injury  Outcome: Progressing Colchicine Counseling:  Patient counseled regarding adverse effects including but not limited to stomach upset (nausea, vomiting, stomach pain, or diarrhea).  Patient instructed to limit alcohol consumption while taking this medication.  Colchicine may reduce blood counts especially with prolonged use.  The patient understands that monitoring of kidney function and blood counts may be required, especially at baseline. The patient verbalized understanding of the proper use and possible adverse effects of colchicine.  All of the patient's questions and concerns were addressed.

## 2023-10-03 NOTE — DISCHARGE INSTR - DIET

## 2024-12-13 ENCOUNTER — APPOINTMENT (OUTPATIENT)
Dept: GENERAL RADIOLOGY | Age: 84
End: 2024-12-13
Payer: MEDICARE

## 2024-12-13 ENCOUNTER — HOSPITAL ENCOUNTER (EMERGENCY)
Age: 84
Discharge: HOME | End: 2024-12-13
Attending: EMERGENCY MEDICINE
Payer: MEDICARE

## 2024-12-13 VITALS
RESPIRATION RATE: 19 BRPM | DIASTOLIC BLOOD PRESSURE: 61 MMHG | WEIGHT: 110 LBS | HEART RATE: 56 BPM | OXYGEN SATURATION: 98 % | HEIGHT: 67 IN | BODY MASS INDEX: 17.27 KG/M2 | SYSTOLIC BLOOD PRESSURE: 153 MMHG | TEMPERATURE: 98.4 F

## 2024-12-13 DIAGNOSIS — S52.614A CLOSED NONDISPLACED FRACTURE OF STYLOID PROCESS OF RIGHT ULNA, INITIAL ENCOUNTER: Primary | ICD-10-CM

## 2024-12-13 PROCEDURE — 73130 X-RAY EXAM OF HAND: CPT

## 2024-12-13 PROCEDURE — 73110 X-RAY EXAM OF WRIST: CPT

## 2024-12-13 PROCEDURE — 99283 EMERGENCY DEPT VISIT LOW MDM: CPT

## 2024-12-13 RX ORDER — OXYCODONE AND ACETAMINOPHEN 5; 325 MG/1; MG/1
1 TABLET ORAL EVERY 6 HOURS PRN
Qty: 12 TABLET | Refills: 0 | Status: SHIPPED | OUTPATIENT
Start: 2024-12-13 | End: 2024-12-16

## 2024-12-13 ASSESSMENT — PAIN DESCRIPTION - ORIENTATION: ORIENTATION: RIGHT

## 2024-12-13 ASSESSMENT — PAIN SCALES - GENERAL: PAINLEVEL_OUTOF10: 6

## 2024-12-13 ASSESSMENT — PAIN - FUNCTIONAL ASSESSMENT: PAIN_FUNCTIONAL_ASSESSMENT: 0-10

## 2024-12-13 ASSESSMENT — PAIN DESCRIPTION - FREQUENCY: FREQUENCY: CONTINUOUS

## 2024-12-13 ASSESSMENT — PAIN DESCRIPTION - LOCATION: LOCATION: WRIST

## 2024-12-13 ASSESSMENT — PAIN DESCRIPTION - DESCRIPTORS: DESCRIPTORS: DISCOMFORT

## 2024-12-13 ASSESSMENT — PAIN DESCRIPTION - PAIN TYPE: TYPE: ACUTE PAIN

## 2024-12-13 NOTE — ED PROVIDER NOTES
EMERGENCY DEPARTMENT ENCOUNTER     [facility] EMERGENCY DEPARTMENT     Pt Name: David Romero   MRN: [id number]   Birthdate 1940   Date of evaluation: 12/13/2024   Provider: Milton Cummings MD   PCP: Jonathon Claros MD   Note Started: 4:03 PM EST 12/13/24     CHIEF COMPLAINT     Chief Complaint   Patient presents with    Wrist Injury     Pt to ED with complaint of falling and hurting his right wrist. Pt states it hurts to  anything. No other injuries.         HISTORY OF PRESENT ILLNESS:          David Romero is a 84 y.o. male who presents with his wife to the ER status post a mechanical trip fall while carrying wood into the house.  Right-hand-dominant male tripped and fell FOOSH onto his right hand and wrist.  Pain and swelling.  This happened half hour prior to arrival.  No active bleeding no head or neck injury.  No loss of consciousness.    Nursing Notes were all reviewed and agreed with or any disagreements were addressed in the HPI.     ROS: Positives and Pertinent negatives as per HPI.    PAST MEDICAL HISTORY     Past medical history:  has a past medical history of BPH (benign prostatic hyperplasia), CAD (coronary artery disease), Cancer (HCC), Cataract, COPD (chronic obstructive pulmonary disease) (HCC), COVID (03/04/2022), Emphysema lung (HCC), Fatty liver (2011), Glaucoma, Heart murmur after rheumatic heart disease, Hyperlipidemia, Hypertension, Left rotator cuff tear, Pneumothorax, Rheumatic fever, Strep throat, and Wears glasses.    Past surgical history:  has a past surgical history that includes Colonoscopy (03/08/2002); Colonoscopy (12/06/2011); Cystoscopy (N/A, 06/16/2021); Cystoscopy (N/A, 6/16/2021); knee surgery (1958); Tonsillectomy; and Cystoscopy (N/A, 10/2/2023).      PHYSICAL EXAM:  ED Triage Vitals   BP Systolic BP Percentile Diastolic BP Percentile Temp Temp src Pulse Resp SpO2   -- -- -- -- -- -- -- --      Height Weight         -- --              Physical Exam  Vitals      (Please note that portions of this note were completed with a voice recognition program.  Efforts were made to edit the dictations but occasionally words are mis-transcribed.)     Milton Cummings MD (electronically signed)        Milton Cummings MD  12/13/24 [id number]

## 2024-12-13 NOTE — DISCHARGE INSTRUCTIONS
You have been seen for your injury for your right hand and wrist from falling today.  There is a broken bone near your wrist you be put in a splint to immobilize it.  There could be other fractures which may not show up on initial x-rays.  Keep the splint on at all times until you see the orthopedist next week.

## 2024-12-13 NOTE — ED NOTES
All follow up care discussed. Pt and family verbalized understandings. No other concerns voiced. Stable and ambulatory upon dismissal.

## (undated) DEVICE — SYRINGE,TOOMEY,IRRIGATION,70CC,STERILE: Brand: MEDLINE

## (undated) DEVICE — GLOVE ORANGE PI 7 1/2   MSG9075

## (undated) DEVICE — Z DUP USE 2522782 SOLUTION IRRIG 1000ML STRL H2O PLAS CONTAINER UROMATIC

## (undated) DEVICE — DBD-PACK,CYSTOSCOPY,PK VI,AURORA: Brand: MEDLINE

## (undated) DEVICE — JELLY,LUBE,STERILE,FLIP TOP,TUBE,2-OZ: Brand: MEDLINE

## (undated) DEVICE — COVER,MAYO STAND,XL,STERILE: Brand: MEDLINE

## (undated) DEVICE — GAUZE,SPONGE,4"X4",8PLY,STRL,LF,10/TRAY: Brand: MEDLINE

## (undated) DEVICE — BAG DRNGE 2000ML ROUNDED TEARDROP W/ ANTIREFLX CHMBR DISP

## (undated) DEVICE — EVACUATOR URO BLDR W/ ADPT UROVAC

## (undated) DEVICE — Device: Brand: AQUABEAM HANDPIECE

## (undated) DEVICE — BOWL MED L 32OZ PLAS W/ MOLD GRAD EZ OPN PEEL PCH

## (undated) DEVICE — SOLUTION IRRIG 3000ML 0.9% SOD CHL USP UROMATIC PLAS CONT

## (undated) DEVICE — Y-TYPE TUR/BLADDER IRRIGATION SET, REGULATING CLAMP

## (undated) DEVICE — SYRINGE, LUER LOCK, 60ML: Brand: MEDLINE

## (undated) DEVICE — BAG URIN STRL FOR URO CTCH SYS

## (undated) DEVICE — CUTTING LOOP, 27FR. ANGLED, STERILE: Brand: N.A.

## (undated) DEVICE — SCRUB KIT POVIDONE IOD IODOPHOR 4 OZ BTL UNSCENTED LF

## (undated) DEVICE — PREP SOL PVP IODINE 4%  4 OZ/BTL

## (undated) DEVICE — SOLUTION IV 500ML 0.9% SOD CHL PH 5 INJ USP VIAFLX PLAS

## (undated) DEVICE — CYSTO/BLADDER IRRIGATION SET, REGULATING CLAMP

## (undated) DEVICE — TUBING, SUCTION, 3/16" X 12', STRAIGHT: Brand: MEDLINE

## (undated) DEVICE — ELECTRODE ES WIDE FRONTLOADING SURF LOOP SUPERSECT

## (undated) DEVICE — SOLUTION IV IRRIG 500ML 0.9% SODIUM CHL 2F7123

## (undated) DEVICE — GOWN SIRUS NONREIN LG W/TWL: Brand: MEDLINE INDUSTRIES, INC.

## (undated) DEVICE — GOWN,REINF,POLY,AURORA,XLNG/XXL,STRL: Brand: MEDLINE

## (undated) DEVICE — CANNULA NSL 13FT TUBE AD ETCO2 DIV SAMP M

## (undated) DEVICE — GLOVE ORANGE PI 8   MSG9080

## (undated) DEVICE — KIT CANSTR VAC TANTEM TB FOR AQUILEX FLD CTRL SYS

## (undated) DEVICE — MEDI-VAC NON-CONDUCTIVE SUCTION TUBING: Brand: CARDINAL HEALTH

## (undated) DEVICE — CATHETER F BLLN 30CC 24FR 3 W SPEC HEMA LNG OPN COUDE TIP

## (undated) DEVICE — SOLUTION IRRIG 2000ML 0.9% SOD CHL USP UROMATIC PLAS CONT

## (undated) DEVICE — PACK DRAPE AQUA ABLATION

## (undated) DEVICE — CATHETER ADAPTER: Brand: ADDTO

## (undated) DEVICE — SYRINGE MED 30ML STD CLR PLAS LUERLOCK TIP N CTRL DISP

## (undated) DEVICE — COVER,TABLE,44X90,STERILE: Brand: MEDLINE